# Patient Record
Sex: MALE | Race: WHITE | Employment: UNEMPLOYED | URBAN - METROPOLITAN AREA
[De-identification: names, ages, dates, MRNs, and addresses within clinical notes are randomized per-mention and may not be internally consistent; named-entity substitution may affect disease eponyms.]

---

## 2018-02-25 ENCOUNTER — APPOINTMENT (OUTPATIENT)
Dept: GENERAL RADIOLOGY | Facility: CLINIC | Age: 48
End: 2018-02-25
Payer: MEDICARE

## 2018-02-25 ENCOUNTER — HOSPITAL ENCOUNTER (EMERGENCY)
Facility: CLINIC | Age: 48
Discharge: HOME | End: 2018-02-25
Attending: EMERGENCY MEDICINE
Payer: MEDICARE

## 2018-02-25 VITALS
WEIGHT: 185.19 LBS | HEART RATE: 94 BPM | HEIGHT: 65 IN | OXYGEN SATURATION: 96 % | BODY MASS INDEX: 30.85 KG/M2 | DIASTOLIC BLOOD PRESSURE: 96 MMHG | SYSTOLIC BLOOD PRESSURE: 142 MMHG | TEMPERATURE: 98.3 F | RESPIRATION RATE: 19 BRPM

## 2018-02-25 DIAGNOSIS — R20.2 PARESTHESIA: Primary | ICD-10-CM

## 2018-02-25 DIAGNOSIS — M79.602 PAIN OF LEFT UPPER EXTREMITY: ICD-10-CM

## 2018-02-25 LAB
ABSOLUTE EOS #: 0.2 K/UL (ref 0–0.4)
ABSOLUTE IMMATURE GRANULOCYTE: ABNORMAL K/UL (ref 0–0.3)
ABSOLUTE LYMPH #: 3 K/UL (ref 1–4.8)
ABSOLUTE MONO #: 0.4 K/UL (ref 0.1–1.2)
ANION GAP SERPL CALCULATED.3IONS-SCNC: 11 MMOL/L (ref 9–17)
BASOPHILS # BLD: 1 % (ref 0–2)
BASOPHILS ABSOLUTE: 0 K/UL (ref 0–0.2)
BUN BLDV-MCNC: 8 MG/DL (ref 6–20)
BUN/CREAT BLD: ABNORMAL (ref 9–20)
CALCIUM SERPL-MCNC: 9.5 MG/DL (ref 8.6–10.4)
CHLORIDE BLD-SCNC: 95 MMOL/L (ref 98–107)
CO2: 27 MMOL/L (ref 20–31)
CREAT SERPL-MCNC: 0.6 MG/DL (ref 0.7–1.2)
D-DIMER QUANTITATIVE: 0.34 MG/L FEU
DIFFERENTIAL TYPE: ABNORMAL
EKG ATRIAL RATE: 83 BPM
EKG P AXIS: 27 DEGREES
EKG P-R INTERVAL: 134 MS
EKG Q-T INTERVAL: 338 MS
EKG QRS DURATION: 84 MS
EKG QTC CALCULATION (BAZETT): 397 MS
EKG R AXIS: -3 DEGREES
EKG T AXIS: 85 DEGREES
EKG VENTRICULAR RATE: 83 BPM
EOSINOPHILS RELATIVE PERCENT: 3 % (ref 1–4)
GFR AFRICAN AMERICAN: >60 ML/MIN
GFR NON-AFRICAN AMERICAN: >60 ML/MIN
GFR SERPL CREATININE-BSD FRML MDRD: ABNORMAL ML/MIN/{1.73_M2}
GFR SERPL CREATININE-BSD FRML MDRD: ABNORMAL ML/MIN/{1.73_M2}
GLUCOSE BLD-MCNC: 319 MG/DL (ref 70–99)
HCT VFR BLD CALC: 47.5 % (ref 41–53)
HEMOGLOBIN: 16.6 G/DL (ref 13.5–17.5)
IMMATURE GRANULOCYTES: ABNORMAL %
INR BLD: 1
LYMPHOCYTES # BLD: 45 % (ref 24–44)
MCH RBC QN AUTO: 28.5 PG (ref 26–34)
MCHC RBC AUTO-ENTMCNC: 34.9 G/DL (ref 31–37)
MCV RBC AUTO: 81.6 FL (ref 80–100)
MONOCYTES # BLD: 5 % (ref 2–11)
NRBC AUTOMATED: ABNORMAL PER 100 WBC
PARTIAL THROMBOPLASTIN TIME: 25 SEC (ref 21.3–31.3)
PDW BLD-RTO: 12.8 % (ref 12.5–15.4)
PLATELET # BLD: 200 K/UL (ref 140–450)
PLATELET ESTIMATE: ABNORMAL
PMV BLD AUTO: 9.3 FL (ref 6–12)
POTASSIUM SERPL-SCNC: 4.3 MMOL/L (ref 3.7–5.3)
PROTHROMBIN TIME: 10.5 SEC (ref 9.4–12.6)
RBC # BLD: 5.82 M/UL (ref 4.5–5.9)
RBC # BLD: ABNORMAL 10*6/UL
SEG NEUTROPHILS: 46 % (ref 36–66)
SEGMENTED NEUTROPHILS ABSOLUTE COUNT: 3.1 K/UL (ref 1.8–7.7)
SODIUM BLD-SCNC: 133 MMOL/L (ref 135–144)
TROPONIN INTERP: NORMAL
TROPONIN T: <0.03 NG/ML
WBC # BLD: 6.7 K/UL (ref 3.5–11)
WBC # BLD: ABNORMAL 10*3/UL

## 2018-02-25 PROCEDURE — 36415 COLL VENOUS BLD VENIPUNCTURE: CPT

## 2018-02-25 PROCEDURE — 85379 FIBRIN DEGRADATION QUANT: CPT

## 2018-02-25 PROCEDURE — 71045 X-RAY EXAM CHEST 1 VIEW: CPT

## 2018-02-25 PROCEDURE — 85730 THROMBOPLASTIN TIME PARTIAL: CPT

## 2018-02-25 PROCEDURE — 99284 EMERGENCY DEPT VISIT MOD MDM: CPT

## 2018-02-25 PROCEDURE — 84484 ASSAY OF TROPONIN QUANT: CPT

## 2018-02-25 PROCEDURE — 80048 BASIC METABOLIC PNL TOTAL CA: CPT

## 2018-02-25 PROCEDURE — 93005 ELECTROCARDIOGRAM TRACING: CPT

## 2018-02-25 PROCEDURE — 85610 PROTHROMBIN TIME: CPT

## 2018-02-25 PROCEDURE — 6370000000 HC RX 637 (ALT 250 FOR IP): Performed by: EMERGENCY MEDICINE

## 2018-02-25 PROCEDURE — 85025 COMPLETE CBC W/AUTO DIFF WBC: CPT

## 2018-02-25 RX ORDER — LORATADINE 10 MG/1
10 TABLET ORAL DAILY
COMMUNITY

## 2018-02-25 RX ORDER — LISINOPRIL 10 MG/1
5 TABLET ORAL DAILY
COMMUNITY

## 2018-02-25 RX ORDER — ASPIRIN 81 MG/1
324 TABLET, CHEWABLE ORAL ONCE
Status: COMPLETED | OUTPATIENT
Start: 2018-02-25 | End: 2018-02-25

## 2018-02-25 RX ORDER — FLUTICASONE PROPIONATE 50 MCG
1 SPRAY, SUSPENSION (ML) NASAL DAILY
COMMUNITY

## 2018-02-25 RX ORDER — GLYBURIDE 5 MG/1
5 TABLET ORAL
COMMUNITY

## 2018-02-25 RX ORDER — KETOTIFEN FUMARATE 0.35 MG/ML
1 SOLUTION/ DROPS OPHTHALMIC 2 TIMES DAILY
COMMUNITY

## 2018-02-25 RX ORDER — ATORVASTATIN CALCIUM 20 MG/1
40 TABLET, FILM COATED ORAL DAILY
COMMUNITY

## 2018-02-25 RX ADMIN — ASPIRIN 81 MG 324 MG: 81 TABLET ORAL at 12:53

## 2018-02-25 ASSESSMENT — PAIN SCALES - GENERAL: PAINLEVEL_OUTOF10: 6

## 2018-02-25 ASSESSMENT — PAIN DESCRIPTION - PAIN TYPE: TYPE: ACUTE PAIN

## 2018-02-25 ASSESSMENT — PAIN DESCRIPTION - ORIENTATION: ORIENTATION: LEFT

## 2018-02-25 ASSESSMENT — PAIN DESCRIPTION - LOCATION: LOCATION: ARM;HAND

## 2018-02-25 NOTE — ED PROVIDER NOTES
[facility] ED  [address] [address]  Phone: [phone]  eMERGENCY dEPARTMENT eNCOUnter      Pt Name: Walt Nolan  MRN: [de-identified]  [place] 1970  Date of evaluation: 2/25/2018      CHIEF COMPLAINT       Chief Complaint   Patient presents with    Numbness     left hand numbness, left upper arm pain. feels better to hold arm down to his side. no injury. previous stroke 5 years ago. HISTORY OF PRESENT ILLNESS    Sanju Pruett is a 53 y.o. male who presents To the emergency department complaining of left upper arm pain and arm numbness intermittent for the past 2 days. Worse with movement. He denies any specific injury states that he did lift up his kid but denied hurting himself at that time. He had an MI 5 years ago and states that this is similar to his pain was much more intense at that time. He has right arm pain but he says that is due to the work that he does that is not new and it is chronic in nature. He denies any weakness. No headache blurry vision double vision or neck pain. No chest pain or shortness of breath. He denies any lightheadedness or dizziness. Denies any other complaints. He does not currently have a cardiologist did not require any heart catheter stents    [facility]       Constitutional: No fevers or chills   HEENT: No sore throat, rhinorrhea, or earache   Eyes: No blurry vision or double vision no drainage   Cardiovascular: No chest pain or tachycardia   Respiratory: No wheezing or shortness of breath no cough   Gastrointestinal: No nausea, vomiting, diarrhea, constipation, or abdominal pain   : No hematuria or dysuria   Musculoskeletal: Positive left arm pain  Skin: No rash   Neurological: Positive left arm numbness    PAST MEDICAL HISTORY    has a past medical history of Diabetes mellitus ([facility] [address] and MI (myocardial infarction).     SURGICAL HISTORY      has no past surgical history on to palpation no deformity. Radial and ulnar arteries intact. Capillary refill less than 2 seconds. No sensory deficit during exam  Neurologic: Moving all 4 extremities without difficulty there are no gross focal neurologic deficits   Skin: Warm and dry     Physical Exam  DIFFERENTIAL DIAGNOSIS/ MDM:     History of cardiac disease. EKG chest x-ray labs and aspirin. Intermittent left arm pain and numbness better at rest worse with movement. DIAGNOSTIC RESULTS     EKG: All EKG's are interpreted by the Emergency Department Physician who either signs or Co-signs this chart in the absence of a cardiologist.    12:51 PM sinus rhythm rate 83  QRS 84  no acute ST or T wave changes.     Not indicated unless otherwise documented above    LABS:  Results for orders placed or performed during the hospital encounter of 02/25/18   CBC Auto Differential   Result Value Ref Range    WBC 6.7 3.5 - 11.0 k/uL    RBC 5.82 4.5 - 5.9 m/uL    Hemoglobin 16.6 13.5 - 17.5 g/dL    Hematocrit 47.5 41 - 53 %    MCV 81.6 80 - 100 fL    MCH 28.5 26 - 34 pg    MCHC 34.9 31 - 37 g/dL    RDW 12.8 12.5 - 15.4 %    Platelets 932 321 - 243 k/uL    MPV 9.3 6.0 - 12.0 fL    NRBC Automated NOT REPORTED per 100 WBC    Differential Type NOT REPORTED     Seg Neutrophils 46 36 - 66 %    Lymphocytes 45 (H) 24 - 44 %    Monocytes 5 2 - 11 %    Eosinophils % 3 1 - 4 %    Basophils 1 0 - 2 %    Immature Granulocytes NOT REPORTED 0 %    Segs Absolute 3.10 1.8 - 7.7 k/uL    Absolute Lymph # 3.00 1.0 - 4.8 k/uL    Absolute Mono # 0.40 0.1 - 1.2 k/uL    Absolute Eos # 0.20 0.0 - 0.4 k/uL    Basophils # 0.00 0.0 - 0.2 k/uL    Absolute Immature Granulocyte NOT REPORTED 0.00 - 0.30 k/uL    WBC Morphology NOT REPORTED     RBC Morphology NOT REPORTED     Platelet Estimate NOT REPORTED    Basic Metabolic Panel   Result Value Ref Range    Glucose 319 (H) 70 - 99 mg/dL    BUN 8 6 - 20 mg/dL    CREATININE 0.60 (L) 0.70 - 1.20 mg/dL    Bun/Cre Ratio NOT REPORTED 9 - 20    Calcium 9.5 8.6 - 10.4 mg/dL    Sodium 133 (L) 135 - 144 mmol/L    Potassium 4.3 3.7 - 5.3 mmol/L    Chloride 95 (L) 98 - 107 mmol/L    CO2 27 20 - 31 mmol/L    Anion Gap 11 9 - 17 mmol/L    GFR Non-African American >60 >60 mL/min    GFR African American >60 >60 mL/min    GFR Comment          GFR Staging NOT REPORTED    Protime-INR   Result Value Ref Range    Protime 10.5 9.4 - 12.6 sec    INR 1.0    APTT   Result Value Ref Range    PTT 25.0 21.3 - 31.3 sec   Troponin   Result Value Ref Range    Troponin T <0.03 <0.03 ng/mL    Troponin Interp         D-Dimer, Quantitative   Result Value Ref Range    D-Dimer, Quant 0.34 mg/L FEU       Not indicated unless otherwise documented above    RADIOLOGY:   I reviewed the radiologist interpretations:    XR CHEST PORTABLE   Final Result   No acute abnormality. Not indicated unless otherwise documented above    EMERGENCY DEPARTMENT COURSE:     The patient was given the following medications:  Orders Placed This Encounter   Medications    aspirin chewable tablet 324 mg        Vitals:    Vitals:    02/25/18 1225 02/25/18 1233 02/25/18 1251   BP:  124/85    Pulse:  94    Resp:  18    Temp:   98.3 °F (36.8 °C)   TempSrc: Oral Oral Oral   SpO2:  97%    Weight:  84 kg (185 lb 3 oz)    Height:  5' 5\" (1.651 m)      -------------------------  /85   Pulse 94   Temp 98.3 °F (36.8 °C) (Oral)   Resp 18   Ht 5' 5\" (1.651 m)   Wt 84 kg (185 lb 3 oz)   SpO2 97%   BMI 30.82 kg/m²     1:45 PM no acute distress. Arm pain with numbness intermittently for the past 2-3 days. Denies specific injury. Troponin negative EKG and negative chest x-ray unremarkable and labs unremarkable. Patient needs to follow-up with his family doctor and/or establish himself with a cardiologist.  His pain is reproducible with movement. Results discussed with him using a  patient voices understanding. Discharge to follow-up.     I have reviewed the disposition

## 2019-01-22 ENCOUNTER — HOSPITAL ENCOUNTER (OUTPATIENT)
Age: 49
Setting detail: SPECIMEN
Discharge: HOME | End: 2019-01-22
Payer: MEDICARE

## 2019-01-22 LAB
ANION GAP SERPL CALCULATED.3IONS-SCNC: 14 MMOL/L (ref 9–17)
BUN BLDV-MCNC: 8 MG/DL (ref 6–20)
BUN/CREAT BLD: ABNORMAL (ref 9–20)
CALCIUM SERPL-MCNC: 9.6 MG/DL (ref 8.6–10.4)
CHLORIDE BLD-SCNC: 101 MMOL/L (ref 98–107)
CO2: 23 MMOL/L (ref 20–31)
CREAT SERPL-MCNC: 0.66 MG/DL (ref 0.7–1.2)
GFR AFRICAN AMERICAN: >60 ML/MIN
GFR NON-AFRICAN AMERICAN: >60 ML/MIN
GFR SERPL CREATININE-BSD FRML MDRD: ABNORMAL ML/MIN/{1.73_M2}
GFR SERPL CREATININE-BSD FRML MDRD: ABNORMAL ML/MIN/{1.73_M2}
GLUCOSE BLD-MCNC: 158 MG/DL (ref 70–99)
POTASSIUM SERPL-SCNC: 4.5 MMOL/L (ref 3.7–5.3)
SODIUM BLD-SCNC: 138 MMOL/L (ref 135–144)

## 2019-09-15 ENCOUNTER — HOSPITAL ENCOUNTER (EMERGENCY)
Facility: CLINIC | Age: 49
Discharge: ANOTHER HOSPITAL | End: 2019-09-15
Attending: EMERGENCY MEDICINE
Payer: MEDICARE

## 2019-09-15 ENCOUNTER — APPOINTMENT (OUTPATIENT)
Dept: CT IMAGING | Facility: CLINIC | Age: 49
End: 2019-09-15
Payer: MEDICARE

## 2019-09-15 ENCOUNTER — APPOINTMENT (OUTPATIENT)
Dept: GENERAL RADIOLOGY | Facility: CLINIC | Age: 49
End: 2019-09-15
Payer: MEDICARE

## 2019-09-15 VITALS
SYSTOLIC BLOOD PRESSURE: 85 MMHG | DIASTOLIC BLOOD PRESSURE: 60 MMHG | TEMPERATURE: 98.6 F | WEIGHT: 215 LBS | OXYGEN SATURATION: 93 % | HEART RATE: 130 BPM | HEIGHT: 67 IN | RESPIRATION RATE: 32 BRPM | BODY MASS INDEX: 33.74 KG/M2

## 2019-09-15 DIAGNOSIS — R65.10 SIRS (SYSTEMIC INFLAMMATORY RESPONSE SYNDROME) (HCC): Primary | ICD-10-CM

## 2019-09-15 LAB
-: ABNORMAL
ABSOLUTE EOS #: 0 K/UL (ref 0–0.4)
ABSOLUTE IMMATURE GRANULOCYTE: ABNORMAL K/UL (ref 0–0.3)
ABSOLUTE LYMPH #: 0.7 K/UL (ref 1–4.8)
ABSOLUTE MONO #: 0.3 K/UL (ref 0.1–1.2)
AMORPHOUS: ABNORMAL
ANION GAP SERPL CALCULATED.3IONS-SCNC: 13 MMOL/L (ref 9–17)
BACTERIA: ABNORMAL
BASOPHILS # BLD: 0 % (ref 0–2)
BASOPHILS ABSOLUTE: 0 K/UL (ref 0–0.2)
BILIRUBIN URINE: NEGATIVE
BNP INTERPRETATION: NORMAL
BUN BLDV-MCNC: 8 MG/DL (ref 6–20)
BUN/CREAT BLD: ABNORMAL (ref 9–20)
CALCIUM SERPL-MCNC: 9.5 MG/DL (ref 8.6–10.4)
CASTS UA: ABNORMAL /LPF (ref 0–2)
CHLORIDE BLD-SCNC: 99 MMOL/L (ref 98–107)
CK MB: 2.4 NG/ML
CO2: 26 MMOL/L (ref 20–31)
COLOR: YELLOW
COMMENT UA: ABNORMAL
CREAT SERPL-MCNC: 0.7 MG/DL (ref 0.7–1.2)
CRYSTALS, UA: ABNORMAL /HPF
DIFFERENTIAL TYPE: ABNORMAL
DIRECT EXAM: NORMAL
EOSINOPHILS RELATIVE PERCENT: 0 % (ref 1–4)
EPITHELIAL CELLS UA: ABNORMAL /HPF (ref 0–5)
GFR AFRICAN AMERICAN: >60 ML/MIN
GFR NON-AFRICAN AMERICAN: >60 ML/MIN
GFR SERPL CREATININE-BSD FRML MDRD: ABNORMAL ML/MIN/{1.73_M2}
GFR SERPL CREATININE-BSD FRML MDRD: ABNORMAL ML/MIN/{1.73_M2}
GLUCOSE BLD-MCNC: 184 MG/DL (ref 70–99)
GLUCOSE URINE: NEGATIVE
HCT VFR BLD CALC: 47.3 % (ref 41–53)
HEMOGLOBIN: 16.1 G/DL (ref 13.5–17.5)
IMMATURE GRANULOCYTES: ABNORMAL %
KETONES, URINE: NEGATIVE
LACTIC ACID: 2.5 MMOL/L (ref 0.5–2.2)
LACTIC ACID: 3 MMOL/L (ref 0.5–2.2)
LEUKOCYTE ESTERASE, URINE: NEGATIVE
LIPASE: 31 U/L (ref 13–60)
LYMPHOCYTES # BLD: 8 % (ref 24–44)
Lab: NORMAL
MCH RBC QN AUTO: 28.2 PG (ref 26–34)
MCHC RBC AUTO-ENTMCNC: 33.9 G/DL (ref 31–37)
MCV RBC AUTO: 83 FL (ref 80–100)
MONOCYTES # BLD: 3 % (ref 2–11)
MUCUS: ABNORMAL
NITRITE, URINE: NEGATIVE
NRBC AUTOMATED: ABNORMAL PER 100 WBC
OTHER OBSERVATIONS UA: ABNORMAL
PDW BLD-RTO: 13.5 % (ref 12.5–15.4)
PH UA: 8.5 (ref 5–8)
PLATELET # BLD: 180 K/UL (ref 140–450)
PLATELET ESTIMATE: ABNORMAL
PMV BLD AUTO: 9.5 FL (ref 6–12)
POTASSIUM SERPL-SCNC: 4.3 MMOL/L (ref 3.7–5.3)
PRO-BNP: 53 PG/ML
PROTEIN UA: NEGATIVE
RBC # BLD: 5.69 M/UL (ref 4.5–5.9)
RBC # BLD: ABNORMAL 10*6/UL
RBC UA: ABNORMAL /HPF (ref 0–2)
RENAL EPITHELIAL, UA: ABNORMAL /HPF
SEG NEUTROPHILS: 89 % (ref 36–66)
SEGMENTED NEUTROPHILS ABSOLUTE COUNT: 7.8 K/UL (ref 1.8–7.7)
SODIUM BLD-SCNC: 138 MMOL/L (ref 135–144)
SPECIFIC GRAVITY UA: 1.01 (ref 1–1.03)
SPECIMEN DESCRIPTION: NORMAL
TRICHOMONAS: ABNORMAL
TROPONIN INTERP: NORMAL
TROPONIN INTERP: NORMAL
TROPONIN T: NORMAL NG/ML
TROPONIN T: NORMAL NG/ML
TROPONIN, HIGH SENSITIVITY: 7 NG/L (ref 0–22)
TROPONIN, HIGH SENSITIVITY: 8 NG/L (ref 0–22)
TURBIDITY: CLEAR
URINE HGB: NEGATIVE
UROBILINOGEN, URINE: ABNORMAL
WBC # BLD: 8.8 K/UL (ref 3.5–11)
WBC # BLD: ABNORMAL 10*3/UL
WBC UA: ABNORMAL /HPF (ref 0–5)
YEAST: ABNORMAL

## 2019-09-15 PROCEDURE — 87205 SMEAR GRAM STAIN: CPT

## 2019-09-15 PROCEDURE — 96365 THER/PROPH/DIAG IV INF INIT: CPT

## 2019-09-15 PROCEDURE — 93005 ELECTROCARDIOGRAM TRACING: CPT | Performed by: EMERGENCY MEDICINE

## 2019-09-15 PROCEDURE — 83605 ASSAY OF LACTIC ACID: CPT

## 2019-09-15 PROCEDURE — 83690 ASSAY OF LIPASE: CPT

## 2019-09-15 PROCEDURE — 85025 COMPLETE CBC W/AUTO DIFF WBC: CPT

## 2019-09-15 PROCEDURE — 84484 ASSAY OF TROPONIN QUANT: CPT

## 2019-09-15 PROCEDURE — 36415 COLL VENOUS BLD VENIPUNCTURE: CPT

## 2019-09-15 PROCEDURE — 82553 CREATINE MB FRACTION: CPT

## 2019-09-15 PROCEDURE — 96375 TX/PRO/DX INJ NEW DRUG ADDON: CPT

## 2019-09-15 PROCEDURE — 71260 CT THORAX DX C+: CPT

## 2019-09-15 PROCEDURE — 80048 BASIC METABOLIC PNL TOTAL CA: CPT

## 2019-09-15 PROCEDURE — 6360000004 HC RX CONTRAST MEDICATION: Performed by: EMERGENCY MEDICINE

## 2019-09-15 PROCEDURE — 2580000003 HC RX 258: Performed by: EMERGENCY MEDICINE

## 2019-09-15 PROCEDURE — 6360000002 HC RX W HCPCS: Performed by: EMERGENCY MEDICINE

## 2019-09-15 PROCEDURE — 83880 ASSAY OF NATRIURETIC PEPTIDE: CPT

## 2019-09-15 PROCEDURE — 6370000000 HC RX 637 (ALT 250 FOR IP): Performed by: EMERGENCY MEDICINE

## 2019-09-15 PROCEDURE — 71046 X-RAY EXAM CHEST 2 VIEWS: CPT

## 2019-09-15 PROCEDURE — 87186 SC STD MICRODIL/AGAR DIL: CPT

## 2019-09-15 PROCEDURE — 87077 CULTURE AEROBIC IDENTIFY: CPT

## 2019-09-15 PROCEDURE — 87804 INFLUENZA ASSAY W/OPTIC: CPT

## 2019-09-15 PROCEDURE — 87040 BLOOD CULTURE FOR BACTERIA: CPT

## 2019-09-15 PROCEDURE — 99285 EMERGENCY DEPT VISIT HI MDM: CPT

## 2019-09-15 PROCEDURE — 96361 HYDRATE IV INFUSION ADD-ON: CPT

## 2019-09-15 PROCEDURE — 81001 URINALYSIS AUTO W/SCOPE: CPT

## 2019-09-15 RX ORDER — 0.9 % SODIUM CHLORIDE 0.9 %
1000 INTRAVENOUS SOLUTION INTRAVENOUS ONCE
Status: COMPLETED | OUTPATIENT
Start: 2019-09-15 | End: 2019-09-15

## 2019-09-15 RX ORDER — SODIUM CHLORIDE 0.9 % (FLUSH) 0.9 %
10 SYRINGE (ML) INJECTION PRN
Status: DISCONTINUED | OUTPATIENT
Start: 2019-09-15 | End: 2019-09-15 | Stop reason: HOSPADM

## 2019-09-15 RX ORDER — FUROSEMIDE 20 MG/1
20 TABLET ORAL DAILY
COMMUNITY

## 2019-09-15 RX ORDER — GABAPENTIN 300 MG/1
300 CAPSULE ORAL 3 TIMES DAILY
COMMUNITY

## 2019-09-15 RX ORDER — ONDANSETRON 2 MG/ML
4 INJECTION INTRAMUSCULAR; INTRAVENOUS ONCE
Status: COMPLETED | OUTPATIENT
Start: 2019-09-15 | End: 2019-09-15

## 2019-09-15 RX ORDER — 0.9 % SODIUM CHLORIDE 0.9 %
70 INTRAVENOUS SOLUTION INTRAVENOUS ONCE
Status: COMPLETED | OUTPATIENT
Start: 2019-09-15 | End: 2019-09-15

## 2019-09-15 RX ORDER — ACETAMINOPHEN 500 MG
1000 TABLET ORAL ONCE
Status: COMPLETED | OUTPATIENT
Start: 2019-09-15 | End: 2019-09-15

## 2019-09-15 RX ORDER — ONDANSETRON 2 MG/ML
4 INJECTION INTRAMUSCULAR; INTRAVENOUS ONCE
Status: DISCONTINUED | OUTPATIENT
Start: 2019-09-15 | End: 2019-09-15 | Stop reason: HOSPADM

## 2019-09-15 RX ADMIN — VANCOMYCIN HYDROCHLORIDE 2500 MG: 1 INJECTION, POWDER, LYOPHILIZED, FOR SOLUTION INTRAVENOUS at 14:31

## 2019-09-15 RX ADMIN — SODIUM CHLORIDE 1000 ML: 9 INJECTION, SOLUTION INTRAVENOUS at 14:12

## 2019-09-15 RX ADMIN — ACETAMINOPHEN 1000 MG: 500 TABLET ORAL at 10:46

## 2019-09-15 RX ADMIN — ONDANSETRON 4 MG: 2 INJECTION INTRAMUSCULAR; INTRAVENOUS at 10:42

## 2019-09-15 RX ADMIN — PIPERACILLIN SODIUM,TAZOBACTAM SODIUM 3.38 G: 3; .375 INJECTION, POWDER, FOR SOLUTION INTRAVENOUS at 13:33

## 2019-09-15 RX ADMIN — SODIUM CHLORIDE 70 ML: 9 INJECTION, SOLUTION INTRAVENOUS at 11:37

## 2019-09-15 RX ADMIN — SODIUM CHLORIDE 1000 ML: 9 INJECTION, SOLUTION INTRAVENOUS at 10:42

## 2019-09-15 RX ADMIN — IOPAMIDOL 80 ML: 755 INJECTION, SOLUTION INTRAVENOUS at 11:37

## 2019-09-15 RX ADMIN — Medication 10 ML: at 11:33

## 2019-09-15 RX ADMIN — SODIUM CHLORIDE 1000 ML: 9 INJECTION, SOLUTION INTRAVENOUS at 12:44

## 2019-09-15 ASSESSMENT — PAIN DESCRIPTION - LOCATION: LOCATION: CHEST

## 2019-09-15 ASSESSMENT — PAIN DESCRIPTION - PAIN TYPE: TYPE: ACUTE PAIN

## 2019-09-15 ASSESSMENT — PAIN - FUNCTIONAL ASSESSMENT: PAIN_FUNCTIONAL_ASSESSMENT: PREVENTS OR INTERFERES SOME ACTIVE ACTIVITIES AND ADLS

## 2019-09-15 ASSESSMENT — PAIN DESCRIPTION - DESCRIPTORS: DESCRIPTORS: ACHING

## 2019-09-15 ASSESSMENT — PAIN SCALES - GENERAL
PAINLEVEL_OUTOF10: 2
PAINLEVEL_OUTOF10: 8
PAINLEVEL_OUTOF10: 8

## 2019-09-15 ASSESSMENT — PAIN DESCRIPTION - FREQUENCY: FREQUENCY: CONTINUOUS

## 2019-09-15 ASSESSMENT — PAIN DESCRIPTION - PROGRESSION: CLINICAL_PROGRESSION: GRADUALLY WORSENING

## 2019-09-15 ASSESSMENT — PAIN DESCRIPTION - ONSET: ONSET: PROGRESSIVE

## 2019-09-15 NOTE — ED NOTES
Pt placed on continuous cardiac and pulse oximetry monitoring.         Jacqueline Russell RN  09/15/19 9736

## 2019-09-15 NOTE — ED PROVIDER NOTES
[facility]  [address] [facility]  Phone: [phone]        Pt Name: Rizwan Wise  MRN: [de-identified]  [place] 1970  Date of evaluation: 9/15/19      CHIEF COMPLAINT       Chief Complaint   Patient presents with    Cough    Chest Pain    Emesis    Chills         HISTORY OF PRESENT ILLNESS    Lucas Escamilla is a 51 y.o. male who presents with chief complaint of a cough this been nonproductive fever nausea vomiting and some mid chest pain began yesterday no diarrhea no abdominal pain no sore throat. Patient does have a history of diabetes heart disease did have a pacemaker placed last year with an AICD      REVIEW OF SYSTEMS       General fever and chills  HEENT no sore throat or congestion  Respiratory cough some chest pain nonproductive  Cardiovascular some chest pain rapid heart rate no edema  GI nausea and vomiting, no diarrhea, no melena, musculoskeletal no swelling or joint pain  No neurologic no headaches or syncope  Skin no rash or pallor this is fine having all pops up    [address]ia [employer] [address]    has a past medical history of Diabetes mellitus ([address] and MI (myocardial infarction) ([facility] [address] SURGICAL HISTORY      has no past surgical history on file. CURRENT MEDICATIONS       Discharge Medication List as of 9/15/2019  3:07 PM      CONTINUE these medications which have NOT CHANGED    Details   metoprolol tartrate (LOPRESSOR) 25 MG tablet Take 25 mg by mouth 2 times dailyHistorical Med      gabapentin (NEURONTIN) 300 MG capsule Take 300 mg by mouth 3 times daily. Historical Med      SITagliptin (JANUVIA) 100 MG tablet Take 100 mg by mouth dailyHistorical Med      sertraline (ZOLOFT) 50 MG tablet Take 50 mg by mouth dailyHistorical Med      furosemide (LASIX) 20 MG tablet Take 20 mg by mouth dailyHistorical Med      insulin glargine (BASAGLAR KWIKPEN) 100 UNIT/ML injection pen Inject into the skin nightlyHistorical Med      aspirin 81 MG tablet Take 81 mg by mouth dailyHistorical Med SUSCEPTIBILITY PANEL MILADY     amikacin Value in next row        NOT REPORTED     ampicillin Value in next row Resistant       >=32RESISTANT     ampicillin-sulbactam Value in next row        NOT REPORTED     aztreonam Value in next row Sensitive       <=1SUSCEPTIBLE     ceFAZolin Value in next row Sensitive       8SUSCEPTIBLE     cefepime Value in next row        NOT REPORTED     cefTRIAXone Value in next row Sensitive       <=1SUSCEPTIBLE     ciprofloxacin Value in next row Sensitive       <=0.25SUSCEPTIBLE     ertapenem Value in next row        NOT REPORTED     Confirmatory Extended Spectrum Beta-Lactamase Value in next row Negative       NOT REPORTED     gentamicin Value in next row Sensitive       <=1SUSCEPTIBLE     meropenem Value in next row        NOT REPORTED     nitrofurantoin Value in next row        NOT REPORTED     tigecycline Value in next row        NOT REPORTED     tobramycin Value in next row Sensitive       <=1SUSCEPTIBLE     trimethoprim-sulfamethoxazole Value in next row Resistant       >=320RESISTANT     piperacillin-tazobactam Value in next row Sensitive       <=4SUSCEPTIBLE   Culture Blood #1   Result Value Ref Range    Specimen Description . BLOOD     Special Requests right antecube 20cc     Culture NO GROWTH 4 DAYS    Rapid influenza A/B antigens   Result Value Ref Range    Specimen Description . NASOPHARYNGEAL SWAB     Special Requests NOT REPORTED     Direct Exam       PRESUMPTIVE NEGATIVE for Influenza A + B antigens. PCR testing to confirm this result is available upon request.  Specimen will be saved in the laboratory for 7 days. Please call [phone] if PCR testing is indicated.    Basic Metabolic Panel   Result Value Ref Range    Glucose 184 (H) 70 - 99 mg/dL    BUN 8 6 - 20 mg/dL    CREATININE 0.70 0.70 - 1.20 mg/dL    Bun/Cre Ratio NOT REPORTED 9 - 20    Calcium 9.5 8.6 - 10.4 mg/dL    Sodium 138 135 - 144 mmol/L    Potassium 4.3 3.7 - 5.3 mmol/L Chloride 99 98 - 107 mmol/L    CO2 26 20 - 31 mmol/L    Anion Gap 13 9 - 17 mmol/L    GFR Non-African American >60 >60 mL/min    GFR African American >60 >60 mL/min    GFR Comment          GFR Staging NOT REPORTED    CK MB   Result Value Ref Range    CK-MB 2.4 <10.5 ng/mL   Lipase   Result Value Ref Range    Lipase 31 13 - 60 U/L   Urinalysis   Result Value Ref Range    Color, UA YELLOW YELLOW    Turbidity UA CLEAR CLEAR    Glucose, Ur NEGATIVE NEGATIVE    Bilirubin Urine NEGATIVE NEGATIVE    Ketones, Urine NEGATIVE NEGATIVE    Specific Gravity, UA 1.015 1.005 - 1.030    Urine Hgb NEGATIVE NEGATIVE    pH, UA 8.5 (H) 5.0 - 8.0    Protein, UA NEGATIVE NEGATIVE    Urobilinogen, Urine ELEVATED (A) Normal    Nitrite, Urine NEGATIVE NEGATIVE    Leukocyte Esterase, Urine NEGATIVE NEGATIVE    Urinalysis Comments NOT REPORTED    Lactic Acid   Result Value Ref Range    Lactic Acid 2.5 (H) 0.5 - 2.2 mmol/L   Troponin   Result Value Ref Range    Troponin, High Sensitivity 7 0 - 22 ng/L    Troponin T NOT REPORTED <0.03 ng/mL    Troponin Interp NOT REPORTED    CBC Auto Differential   Result Value Ref Range    WBC 8.8 3.5 - 11.0 k/uL    RBC 5.69 4.5 - 5.9 m/uL    Hemoglobin 16.1 13.5 - 17.5 g/dL    Hematocrit 47.3 41 - 53 %    MCV 83.0 80 - 100 fL    MCH 28.2 26 - 34 pg    MCHC 33.9 31 - 37 g/dL    RDW 13.5 12.5 - 15.4 %    Platelets 263 846 - 712 k/uL    MPV 9.5 6.0 - 12.0 fL    NRBC Automated NOT REPORTED per 100 WBC    Differential Type NOT REPORTED     Seg Neutrophils 89 (H) 36 - 66 %    Lymphocytes 8 (L) 24 - 44 %    Monocytes 3 2 - 11 %    Eosinophils % 0 (L) 1 - 4 %    Basophils 0 0 - 2 %    Immature Granulocytes NOT REPORTED 0 %    Segs Absolute 7.80 (H) 1.8 - 7.7 k/uL    Absolute Lymph # 0.70 (L) 1.0 - 4.8 k/uL    Absolute Mono # 0.30 0.1 - 1.2 k/uL    Absolute Eos # 0.00 0.0 - 0.4 k/uL    Basophils Absolute 0.00 0.0 - 0.2 k/uL    Absolute Immature Granulocyte NOT REPORTED 0.00 - 0.30 k/uL    WBC Morphology NOT REPORTED

## 2019-09-16 LAB
EKG ATRIAL RATE: 120 BPM
EKG ATRIAL RATE: 133 BPM
EKG P AXIS: 52 DEGREES
EKG P-R INTERVAL: 136 MS
EKG P-R INTERVAL: 146 MS
EKG Q-T INTERVAL: 286 MS
EKG Q-T INTERVAL: 314 MS
EKG QRS DURATION: 72 MS
EKG QRS DURATION: 72 MS
EKG QTC CALCULATION (BAZETT): 425 MS
EKG QTC CALCULATION (BAZETT): 443 MS
EKG R AXIS: -11 DEGREES
EKG R AXIS: 48 DEGREES
EKG T AXIS: -32 DEGREES
EKG T AXIS: 110 DEGREES
EKG VENTRICULAR RATE: 120 BPM
EKG VENTRICULAR RATE: 133 BPM

## 2019-09-17 LAB
CULTURE: ABNORMAL
Lab: ABNORMAL
SPECIMEN DESCRIPTION: ABNORMAL

## 2019-09-21 LAB
CULTURE: NORMAL
Lab: NORMAL
SPECIMEN DESCRIPTION: NORMAL

## 2022-03-22 ENCOUNTER — HOSPITAL ENCOUNTER (EMERGENCY)
Facility: CLINIC | Age: 52
Discharge: HOME | End: 2022-03-22
Attending: EMERGENCY MEDICINE
Payer: MEDICARE

## 2022-03-22 ENCOUNTER — APPOINTMENT (OUTPATIENT)
Dept: CT IMAGING | Facility: CLINIC | Age: 52
End: 2022-03-22
Payer: MEDICARE

## 2022-03-22 VITALS
HEIGHT: 67 IN | HEART RATE: 92 BPM | SYSTOLIC BLOOD PRESSURE: 121 MMHG | DIASTOLIC BLOOD PRESSURE: 82 MMHG | TEMPERATURE: 98.1 F | WEIGHT: 200 LBS | OXYGEN SATURATION: 98 % | RESPIRATION RATE: 16 BRPM | BODY MASS INDEX: 31.39 KG/M2

## 2022-03-22 DIAGNOSIS — R19.7 DIARRHEA, UNSPECIFIED TYPE: ICD-10-CM

## 2022-03-22 DIAGNOSIS — R07.9 CHEST PAIN, UNSPECIFIED TYPE: ICD-10-CM

## 2022-03-22 DIAGNOSIS — N28.1 RENAL CYST, LEFT: ICD-10-CM

## 2022-03-22 DIAGNOSIS — R10.32 LEFT LOWER QUADRANT ABDOMINAL PAIN: Primary | ICD-10-CM

## 2022-03-22 LAB
ABSOLUTE EOS #: 0.11 K/UL (ref 0–0.4)
ABSOLUTE LYMPH #: 1.26 K/UL (ref 1–4.8)
ABSOLUTE MONO #: 0.42 K/UL (ref 0.1–0.8)
ANION GAP SERPL CALCULATED.3IONS-SCNC: 13 MMOL/L (ref 9–17)
ATYPICAL LYMPHOCYTE ABSOLUTE COUNT: 0.42 K/UL
ATYPICAL LYMPHOCYTES: 4 %
BASOPHILS # BLD: 1 % (ref 0–2)
BASOPHILS ABSOLUTE: 0.11 K/UL (ref 0–0.2)
BILIRUBIN URINE: NEGATIVE
BUN BLDV-MCNC: 11 MG/DL (ref 6–20)
CALCIUM SERPL-MCNC: 8.1 MG/DL (ref 8.6–10.4)
CHLORIDE BLD-SCNC: 101 MMOL/L (ref 98–107)
CO2: 24 MMOL/L (ref 20–31)
COLOR: YELLOW
COMMENT UA: NORMAL
CREAT SERPL-MCNC: 0.7 MG/DL (ref 0.7–1.2)
D-DIMER QUANTITATIVE: 3.28 MG/L FEU
EKG ATRIAL RATE: 84 BPM
EKG ATRIAL RATE: 90 BPM
EKG P AXIS: 44 DEGREES
EKG P AXIS: 51 DEGREES
EKG P-R INTERVAL: 120 MS
EKG P-R INTERVAL: 148 MS
EKG Q-T INTERVAL: 354 MS
EKG Q-T INTERVAL: 356 MS
EKG QRS DURATION: 78 MS
EKG QRS DURATION: 86 MS
EKG QTC CALCULATION (BAZETT): 420 MS
EKG QTC CALCULATION (BAZETT): 433 MS
EKG R AXIS: -4 DEGREES
EKG R AXIS: -6 DEGREES
EKG T AXIS: 81 DEGREES
EKG T AXIS: 84 DEGREES
EKG VENTRICULAR RATE: 84 BPM
EKG VENTRICULAR RATE: 90 BPM
EOSINOPHILS RELATIVE PERCENT: 1 % (ref 1–4)
GFR AFRICAN AMERICAN: >60 ML/MIN
GFR NON-AFRICAN AMERICAN: >60 ML/MIN
GFR SERPL CREATININE-BSD FRML MDRD: ABNORMAL ML/MIN/{1.73_M2}
GLUCOSE BLD-MCNC: 150 MG/DL (ref 70–99)
GLUCOSE URINE: NEGATIVE
HCT VFR BLD CALC: 40.2 % (ref 41–53)
HEMOGLOBIN: 12.5 G/DL (ref 13.5–17.5)
INR BLD: 1.1
KETONES, URINE: NEGATIVE
LEUKOCYTE ESTERASE, URINE: NEGATIVE
LIPASE: 33 U/L (ref 13–60)
LYMPHOCYTES # BLD: 12 % (ref 24–44)
MCH RBC QN AUTO: 20.7 PG (ref 26–34)
MCHC RBC AUTO-ENTMCNC: 31.1 G/DL (ref 31–37)
MCV RBC AUTO: 66.5 FL (ref 80–100)
MONOCYTES # BLD: 4 % (ref 1–7)
MORPHOLOGY: ABNORMAL
NITRITE, URINE: NEGATIVE
PARTIAL THROMBOPLASTIN TIME: 25.2 SEC (ref 21.3–31.3)
PDW BLD-RTO: 16.8 % (ref 12.5–15.4)
PH UA: 5.5 (ref 5–8)
PLATELET # BLD: 153 K/UL (ref 140–450)
PMV BLD AUTO: 8.3 FL (ref 6–12)
POTASSIUM SERPL-SCNC: 4.3 MMOL/L (ref 3.7–5.3)
PRO-BNP: 368 PG/ML
PROTEIN UA: NEGATIVE
PROTHROMBIN TIME: 11.8 SEC (ref 9.4–12.6)
RBC # BLD: 6.05 M/UL (ref 4.5–5.9)
SEG NEUTROPHILS: 78 % (ref 36–66)
SEGMENTED NEUTROPHILS ABSOLUTE COUNT: 8.18 K/UL (ref 1.8–7.7)
SODIUM BLD-SCNC: 138 MMOL/L (ref 135–144)
SPECIFIC GRAVITY UA: 1.02 (ref 1–1.03)
TROPONIN, HIGH SENSITIVITY: 25 NG/L (ref 0–22)
TROPONIN, HIGH SENSITIVITY: 26 NG/L (ref 0–22)
TURBIDITY: CLEAR
URINE HGB: NEGATIVE
UROBILINOGEN, URINE: NORMAL
WBC # BLD: 10.5 K/UL (ref 3.5–11)

## 2022-03-22 PROCEDURE — 6360000002 HC RX W HCPCS: Performed by: EMERGENCY MEDICINE

## 2022-03-22 PROCEDURE — 80048 BASIC METABOLIC PNL TOTAL CA: CPT

## 2022-03-22 PROCEDURE — 85610 PROTHROMBIN TIME: CPT

## 2022-03-22 PROCEDURE — 71260 CT THORAX DX C+: CPT

## 2022-03-22 PROCEDURE — 6360000004 HC RX CONTRAST MEDICATION: Performed by: EMERGENCY MEDICINE

## 2022-03-22 PROCEDURE — 96375 TX/PRO/DX INJ NEW DRUG ADDON: CPT

## 2022-03-22 PROCEDURE — 2580000003 HC RX 258: Performed by: EMERGENCY MEDICINE

## 2022-03-22 PROCEDURE — 74177 CT ABD & PELVIS W/CONTRAST: CPT

## 2022-03-22 PROCEDURE — 96374 THER/PROPH/DIAG INJ IV PUSH: CPT

## 2022-03-22 PROCEDURE — 84484 ASSAY OF TROPONIN QUANT: CPT

## 2022-03-22 PROCEDURE — 6370000000 HC RX 637 (ALT 250 FOR IP): Performed by: EMERGENCY MEDICINE

## 2022-03-22 PROCEDURE — 83880 ASSAY OF NATRIURETIC PEPTIDE: CPT

## 2022-03-22 PROCEDURE — 83690 ASSAY OF LIPASE: CPT

## 2022-03-22 PROCEDURE — 99285 EMERGENCY DEPT VISIT HI MDM: CPT

## 2022-03-22 PROCEDURE — 93005 ELECTROCARDIOGRAM TRACING: CPT | Performed by: EMERGENCY MEDICINE

## 2022-03-22 PROCEDURE — 81003 URINALYSIS AUTO W/O SCOPE: CPT

## 2022-03-22 PROCEDURE — 85025 COMPLETE CBC W/AUTO DIFF WBC: CPT

## 2022-03-22 PROCEDURE — 85730 THROMBOPLASTIN TIME PARTIAL: CPT

## 2022-03-22 PROCEDURE — 85379 FIBRIN DEGRADATION QUANT: CPT

## 2022-03-22 PROCEDURE — 36415 COLL VENOUS BLD VENIPUNCTURE: CPT

## 2022-03-22 RX ORDER — ASPIRIN 81 MG/1
324 TABLET, CHEWABLE ORAL ONCE
Status: COMPLETED | OUTPATIENT
Start: 2022-03-22 | End: 2022-03-22

## 2022-03-22 RX ORDER — 0.9 % SODIUM CHLORIDE 0.9 %
70 INTRAVENOUS SOLUTION INTRAVENOUS ONCE
Status: COMPLETED | OUTPATIENT
Start: 2022-03-22 | End: 2022-03-22

## 2022-03-22 RX ORDER — SODIUM CHLORIDE 0.9 % (FLUSH) 0.9 %
5-40 SYRINGE (ML) INJECTION PRN
Status: DISCONTINUED | OUTPATIENT
Start: 2022-03-22 | End: 2022-03-22 | Stop reason: HOSPADM

## 2022-03-22 RX ORDER — 0.9 % SODIUM CHLORIDE 0.9 %
1000 INTRAVENOUS SOLUTION INTRAVENOUS ONCE
Status: COMPLETED | OUTPATIENT
Start: 2022-03-22 | End: 2022-03-22

## 2022-03-22 RX ORDER — FENTANYL CITRATE 50 UG/ML
50 INJECTION, SOLUTION INTRAMUSCULAR; INTRAVENOUS ONCE
Status: COMPLETED | OUTPATIENT
Start: 2022-03-22 | End: 2022-03-22

## 2022-03-22 RX ADMIN — ASPIRIN 81 MG 324 MG: 81 TABLET ORAL at 02:45

## 2022-03-22 RX ADMIN — HYDROMORPHONE HYDROCHLORIDE 1 MG: 1 INJECTION, SOLUTION INTRAMUSCULAR; INTRAVENOUS; SUBCUTANEOUS at 03:52

## 2022-03-22 RX ADMIN — FENTANYL CITRATE 50 MCG: 0.05 INJECTION, SOLUTION INTRAMUSCULAR; INTRAVENOUS at 02:46

## 2022-03-22 RX ADMIN — SODIUM CHLORIDE 1000 ML: 9 INJECTION, SOLUTION INTRAVENOUS at 03:00

## 2022-03-22 RX ADMIN — SODIUM CHLORIDE 70 ML: 9 INJECTION, SOLUTION INTRAVENOUS at 03:22

## 2022-03-22 RX ADMIN — IOPAMIDOL 100 ML: 755 INJECTION, SOLUTION INTRAVENOUS at 03:22

## 2022-03-22 RX ADMIN — Medication 10 ML: at 03:23

## 2022-03-22 ASSESSMENT — PAIN DESCRIPTION - PAIN TYPE: TYPE: ACUTE PAIN

## 2022-03-22 ASSESSMENT — PAIN DESCRIPTION - ONSET: ONSET: ON-GOING

## 2022-03-22 ASSESSMENT — HEART SCORE: ECG: 0

## 2022-03-22 ASSESSMENT — PAIN SCALES - GENERAL
PAINLEVEL_OUTOF10: 9
PAINLEVEL_OUTOF10: 9

## 2022-03-22 ASSESSMENT — PAIN DESCRIPTION - LOCATION: LOCATION: ABDOMEN

## 2022-03-22 ASSESSMENT — PAIN DESCRIPTION - FREQUENCY: FREQUENCY: CONTINUOUS

## 2022-03-22 ASSESSMENT — PAIN DESCRIPTION - ORIENTATION: ORIENTATION: LEFT

## 2022-03-22 ASSESSMENT — PAIN DESCRIPTION - PROGRESSION: CLINICAL_PROGRESSION: NOT CHANGED

## 2022-03-22 NOTE — ED PROVIDER NOTES
[facility]  [facility]  Phone: [phone]      Pt Name: Stacey Mcelroy  [de-identified]  [place] 1970  Date of evaluation: 3/22/2022      CHIEF COMPLAINT       Chief Complaint   Patient presents with    Abdominal Pain    Chest Pain       HISTORY OF PRESENT ILLNESS   Stacey Mcelroy is a 47 y.o. male with history of hypertension, hyperlipidemia, type 2 diabetes on insulin, and CAD who presents for evaluation of chest pain and abdominal pain. The patient is Portuguese speaking only and history is obtained using a video . The patient reports that starting around 8 PM tonight he developed gradual onset, constant, progressive, sharp, stabbing, left upper and left lower abdominal pain with associated left-sided chest pressure that he feels is being caused from the pressure in his abdomen. He also complains of 4 episodes of watery nonbloody diarrhea. He took some pain medicine at home prior to arrival but is unsure of what he took. He does not list any provoking or palliating factors. The patient states that he has history of cholecystectomy but denies any other abdominal surgeries. He denies recent antibiotic use or recent travel. The patient does have history of an MI that he reports was 20 years ago but did not require a stent. He thinks that his last cardiac cath was approximately 3 years ago and he is unsure of the date of his last stress test.  His cardiologist is Dr. Christie Weber. The patient states that he does have a pacemaker. He denies fever, chills, headache, vision changes, neck pain, back pain, chest injury, abdominal trauma, urinary symptoms, penile pain, scrotal pain, genital lesions, nausea, vomiting, diaphoresis, dizziness, lightheadedness, syncope, focal weakness, numbness, tingling, recent injury or illness.     REVIEW OF SYSTEMS     Ten point review of systems was reviewed and is negative unless otherwise noted in the HPI    PAST MEDICAL HISTORY    has a past medical history of CAD (coronary artery disease), Diabetes mellitus ([facility] [address] Hyperlipidemia, Hypertension, and MI (myocardial infarction) ([facility] [address] SURGICAL HISTORY      has a past surgical history that includes Cholecystectomy. CURRENT MEDICATIONS       Previous Medications    ASPIRIN 81 MG TABLET    Take 81 mg by mouth daily    ATORVASTATIN (LIPITOR) 20 MG TABLET    Take 40 mg by mouth daily     CHOLECALCIFEROL (VITAMIN D3) 5000 UNITS TABS    Take by mouth    FLUTICASONE (FLONASE) 50 MCG/ACT NASAL SPRAY    1 spray by Nasal route daily    FUROSEMIDE (LASIX) 20 MG TABLET    Take 20 mg by mouth daily    GABAPENTIN (NEURONTIN) 300 MG CAPSULE    Take 300 mg by mouth 3 times daily. GLYBURIDE (DIABETA) 5 MG TABLET    Take 5 mg by mouth daily (with breakfast)    INSULIN GLARGINE (BASAGLAR KWIKPEN) 100 UNIT/ML INJECTION PEN    Inject into the skin nightly    KETOTIFEN (ZADITOR) 0.025 % OPHTHALMIC SOLUTION    1 drop 2 times daily    LIRAGLUTIDE (VICTOZA) 18 MG/3ML SOPN SC INJECTION    Inject 1.2 mg into the skin daily    LISINOPRIL (PRINIVIL;ZESTRIL) 10 MG TABLET    Take 5 mg by mouth daily     LORATADINE (CLARITIN) 10 MG TABLET    Take 10 mg by mouth daily    METOPROLOL TARTRATE (LOPRESSOR) 25 MG TABLET    Take 25 mg by mouth 2 times daily    SERTRALINE (ZOLOFT) 50 MG TABLET    Take 50 mg by mouth daily    SITAGLIPTIN (JANUVIA) 100 MG TABLET    Take 100 mg by mouth daily       ALLERGIES     has No Known Allergies. FAMILY HISTORY     has no family status information on file. family history is not on file. SOCIAL HISTORY      reports that he quit smoking about 9 years ago. He has never used smokeless tobacco. He reports that he does not drink alcohol and does not use drugs. PHYSICAL EXAM     INITIAL VITALS:  height is 5' 6.93\" (1.7 m) and weight is 90.7 kg (200 lb). His oral temperature is 98.1 °F (36.7 °C). His blood pressure is 122/74 and his pulse is 87.  His respiration is 18 and oxygen saturation is 97%. CONSTITUTIONAL: Appears uncomfortable, nontoxic  SKIN: warm, dry, no jaundice, hives or petechiae  EYES: clear conjunctiva, non-icteric sclera  HENT: normocephalic, atraumatic, moist mucus membranes  NECK: Nontender and supple with no nuchal rigidity, full range of motion  PULMONARY: clear to auscultation without wheezes, rhonchi, or rales, normal excursion, no accessory muscle use and no stridor  CARDIOVASCULAR: regular rate, rhythm. Strong radial pulses with intact distal perfusion. Capillary refill <2 seconds. GASTROINTESTINAL: soft, obese, generalized tenderness to palpation with increased tenderness over the left upper quadrant and left lower quadrant, no pain over McBurney's point, negative Dillard sign, no pulsatile mass, non-distended, no palpable masses, no rebound or guarding   GENITOURINARY: No costovertebral angle tenderness to palpation  MUSCULOSKELETAL: No midline spinal tenderness, step off or deformity. Extremities are otherwise nontender to palpation and nonerythematous. Compartments soft. No peripheral edema. NEUROLOGIC: alert and oriented x 3, GCS 15, normal mentation and speech.  Moves all extremities x 4 without motor or sensory deficit, gait is stable without ataxia  PSYCHIATRIC: normal mood and affect, thought process is clear and linear    DIAGNOSTIC RESULTS     EKG:  EKG 228am Sinus rhythm, rate 90 bpm, normal axis, normal intervals, no ST elevation or depression, no T wave inversions, T wave flattening in V4, V5, V6, 1 and aVL, poor R wave progression, Q wave in aVR, no change from EKG on 9/15/2019    EKG 4:33 AM sinus rhythm, rate 84 bpm, normal axis, normal intervals, no ST elevation or depression, T wave flattening in 1 and aVL, poor R wave progression, Q wave in aVR, no change from first EKG    RADIOLOGY:   CT ABDOMEN PELVIS W IV CONTRAST    Result Date: 3/22/2022  EXAMINATION: CTA OF THE CHEST; CT OF THE ABDOMEN AND PELVIS WITH CONTRAST 3/22/2022 3:13 am TECHNIQUE: CTA of the chest was performed after the administration of intravenous contrast.  Multiplanar reformatted images are provided for review. MIP images are provided for review. Dose modulation, iterative reconstruction, and/or weight based adjustment of the mA/kV was utilized to reduce the radiation dose to as low as reasonably achievable.; CT of the abdomen and pelvis was performed with the administration of intravenous contrast. Multiplanar reformatted images are provided for review. Dose modulation, iterative reconstruction, and/or weight based adjustment of the mA/kV was utilized to reduce the radiation dose to as low as reasonably achievable. COMPARISON: CT PA dated 09/15/2013. HISTORY: ORDERING SYSTEM PROVIDED HISTORY: chest pain, elevated d-dimer TECHNOLOGIST PROVIDED HISTORY: chest pain, elevated d-dimer Decision Support Exception - unselect if not a suspected or confirmed emergency medical condition->Emergency Medical Condition (MA) Reason for Exam: Left chest pain, elevated Ddimer and Left abdomen pain FINDINGS: CTPA: Pulmonary Arteries: Pulmonary arteries are adequately opacified for evaluation. No evidence of intraluminal filling defect to suggest pulmonary embolism. Main pulmonary artery is normal in caliber. Mediastinum: No evidence of mediastinal lymphadenopathy. The heart and pericardium demonstrate no acute abnormality. There is no acute abnormality of the thoracic aorta. Lungs/pleura: The lungs are without acute process. No focal consolidation or pulmonary edema. No evidence of pleural effusion or pneumothorax. Soft Tissues/Bones: No acute bone or soft tissue abnormality. ABDOMEN/PELVIS: Organs:  Liver enhances normally without evidence of intrahepatic biliary ductal dilatation. Status post cholecystectomy The spleen, pancreas and adrenal glands are unremarkable.   The kidneys and ureters are noted for mildly heterogeneous hypodensity at medial aspect of left upper pole measuring 1.4 x 0.9 x 2.1 cm. GI/Bowel: Stomach and duodenal sweep demonstrate no acute abnormality. There is no evidence of bowel obstruction. No evidence of abnormal bowel wall thickening or distension. The appendix is visualized and is unremarkable. No evidence of acute appendicitis. Pelvis: The bladder and reproductive organs are unremarkable. Peritoneum/Retroperitoneum: No evidence of ascites or free air. No evidence of lymphadenopathy. Aorta is normal in caliber. Bones/Soft Tissues: No acute bone or soft tissue abnormality. No evidence of pulmonary embolism or acute pulmonary abnormality. Mildly heterogeneous hypodensity at the medial aspect of the left upper pole measuring 1.4 x 0.9 x 2.1 cm. This most likely represents a cyst.  Follow-up ultrasound or renal mass protocol CT recommended for further evaluation. CT CHEST PULMONARY EMBOLISM W CONTRAST    Result Date: 3/22/2022  EXAMINATION: CTA OF THE CHEST; CT OF THE ABDOMEN AND PELVIS WITH CONTRAST 3/22/2022 3:13 am TECHNIQUE: CTA of the chest was performed after the administration of intravenous contrast.  Multiplanar reformatted images are provided for review. MIP images are provided for review. Dose modulation, iterative reconstruction, and/or weight based adjustment of the mA/kV was utilized to reduce the radiation dose to as low as reasonably achievable.; CT of the abdomen and pelvis was performed with the administration of intravenous contrast. Multiplanar reformatted images are provided for review. Dose modulation, iterative reconstruction, and/or weight based adjustment of the mA/kV was utilized to reduce the radiation dose to as low as reasonably achievable. COMPARISON: CT PA dated 09/15/2013.  HISTORY: ORDERING SYSTEM PROVIDED HISTORY: chest pain, elevated d-dimer TECHNOLOGIST PROVIDED HISTORY: chest pain, elevated d-dimer Decision Support Exception - unselect if not a suspected or confirmed emergency medical condition->Emergency Medical Condition (MA) Reason for Exam: Left chest pain, elevated Ddimer and Left abdomen pain FINDINGS: CTPA: Pulmonary Arteries: Pulmonary arteries are adequately opacified for evaluation. No evidence of intraluminal filling defect to suggest pulmonary embolism. Main pulmonary artery is normal in caliber. Mediastinum: No evidence of mediastinal lymphadenopathy. The heart and pericardium demonstrate no acute abnormality. There is no acute abnormality of the thoracic aorta. Lungs/pleura: The lungs are without acute process. No focal consolidation or pulmonary edema. No evidence of pleural effusion or pneumothorax. Soft Tissues/Bones: No acute bone or soft tissue abnormality. ABDOMEN/PELVIS: Organs:  Liver enhances normally without evidence of intrahepatic biliary ductal dilatation. Status post cholecystectomy The spleen, pancreas and adrenal glands are unremarkable. The kidneys and ureters are noted for mildly heterogeneous hypodensity at medial aspect of left upper pole measuring 1.4 x 0.9 x 2.1 cm. GI/Bowel: Stomach and duodenal sweep demonstrate no acute abnormality. There is no evidence of bowel obstruction. No evidence of abnormal bowel wall thickening or distension. The appendix is visualized and is unremarkable. No evidence of acute appendicitis. Pelvis: The bladder and reproductive organs are unremarkable. Peritoneum/Retroperitoneum: No evidence of ascites or free air. No evidence of lymphadenopathy. Aorta is normal in caliber. Bones/Soft Tissues: No acute bone or soft tissue abnormality. No evidence of pulmonary embolism or acute pulmonary abnormality. Mildly heterogeneous hypodensity at the medial aspect of the left upper pole measuring 1.4 x 0.9 x 2.1 cm. This most likely represents a cyst.  Follow-up ultrasound or renal mass protocol CT recommended for further evaluation.        LABS:  Results for orders placed or performed during the hospital encounter of 03/22/22   CBC with Auto Differential   Result Value Ref Range    WBC 10.5 3.5 - 11.0 k/uL    RBC 6.05 (H) 4.5 - 5.9 m/uL    Hemoglobin 12.5 (L) 13.5 - 17.5 g/dL    Hematocrit 40.2 (L) 41 - 53 %    MCV 66.5 (L) 80 - 100 fL    MCH 20.7 (L) 26 - 34 pg    MCHC 31.1 31 - 37 g/dL    RDW 16.8 (H) 12.5 - 15.4 %    Platelets 310 917 - 622 k/uL    MPV 8.3 6.0 - 12.0 fL    Seg Neutrophils 78 (H) 36 - 66 %    Lymphocytes 12 (L) 24 - 44 %    Atypical Lymphocytes 4 %    Monocytes 4 1 - 7 %    Eosinophils % 1 1 - 4 %    Basophils 1 0 - 2 %    Segs Absolute 8.18 (H) 1.8 - 7.7 k/uL    Absolute Lymph # 1.26 1.0 - 4.8 k/uL    Atypical Lymphocytes Absolute 0.42 k/uL    Absolute Mono # 0.42 0.1 - 0.8 k/uL    Absolute Eos # 0.11 0.0 - 0.4 k/uL    Basophils Absolute 0.11 0.0 - 0.2 k/uL    Morphology MICROCYTOSIS PRESENT     Morphology ANISOCYTOSIS PRESENT     Morphology HYPOCHROMIA PRESENT     Morphology LARGE PLATELETS PRESENT    Basic Metabolic Panel w/ Reflex to MG   Result Value Ref Range    Glucose 150 (H) 70 - 99 mg/dL    BUN 11 6 - 20 mg/dL    CREATININE 0.70 0.70 - 1.20 mg/dL    Calcium 8.1 (L) 8.6 - 10.4 mg/dL    Sodium 138 135 - 144 mmol/L    Potassium 4.3 3.7 - 5.3 mmol/L    Chloride 101 98 - 107 mmol/L    CO2 24 20 - 31 mmol/L    Anion Gap 13 9 - 17 mmol/L    GFR Non-African American >60 >60 mL/min    GFR African American >60 >60 mL/min    GFR Comment         Lipase   Result Value Ref Range    Lipase 33 13 - 60 U/L   Troponin   Result Value Ref Range    Troponin, High Sensitivity 26 (H) 0 - 22 ng/L   Brain Natriuretic Peptide   Result Value Ref Range    Pro- (H) <300 pg/mL   Protime-INR   Result Value Ref Range    Protime 11.8 9.4 - 12.6 sec    INR 1.1    APTT   Result Value Ref Range    PTT 25.2 21.3 - 31.3 sec   Urinalysis with Reflex to Culture    Specimen: Urine   Result Value Ref Range    Color, UA Yellow Yellow    Turbidity UA Clear Clear    Glucose, Ur NEGATIVE NEGATIVE    Bilirubin Urine NEGATIVE NEGATIVE    Ketones, Urine NEGATIVE NEGATIVE    Specific Gravity, UA 1.023 1.005 - 1.030    Urine Hgb NEGATIVE NEGATIVE    pH, UA 5.5 5.0 - 8.0    Protein, UA NEGATIVE NEGATIVE    Urobilinogen, Urine Normal Normal    Nitrite, Urine NEGATIVE NEGATIVE    Leukocyte Esterase, Urine NEGATIVE NEGATIVE    Urinalysis Comments       Microscopic exam not performed based on chemical results unless requested in original order. Urinalysis Comments          Urinalysis Comments       Utilizing a urinalysis as the only screening method to exclude a potential uropathogen can be unreliable in many patient populations. Rapid screening tests are less sensitive than culture and if UTI is a clinical possibility, culture should be considered despite a negative urinalysis.    D-Dimer, Quantitative   Result Value Ref Range    D-Dimer, Quant 3.28 mg/L FEU   Troponin   Result Value Ref Range    Troponin, High Sensitivity 25 (H) 0 - 22 ng/L   EKG 12 Lead   Result Value Ref Range    Ventricular Rate 90 BPM    Atrial Rate 90 BPM    P-R Interval 120 ms    QRS Duration 78 ms    Q-T Interval 354 ms    QTc Calculation (Bazett) 433 ms    P Axis 51 degrees    R Axis -4 degrees    T Axis 81 degrees   EKG 12 Lead   Result Value Ref Range    Ventricular Rate 84 BPM    Atrial Rate 84 BPM    P-R Interval 148 ms    QRS Duration 86 ms    Q-T Interval 356 ms    QTc Calculation (Bazett) 420 ms    P Axis 44 degrees    R Axis -6 degrees    T Axis 84 degrees       EMERGENCY DEPARTMENT COURSE:        The patient was given the following medications:  Orders Placed This Encounter   Medications    0.9 % sodium chloride bolus    fentaNYL (SUBLIMAZE) injection 50 mcg    aspirin chewable tablet 324 mg    iopamidol (ISOVUE-370) 76 % injection 100 mL    0.9 % sodium chloride bolus    sodium chloride flush 0.9 % injection 5-40 mL    HYDROmorphone (DILAUDID) injection 1 mg        Vitals:    Vitals:    03/22/22 0231   BP: 122/74   Pulse: 87   Resp: 18   Temp: 98.1 °F (36.7 °C)   TempSrc: Oral   SpO2: 97%   Weight: 90.7 kg (200 lb)   Height: 5' 6.93\" (1.7 m)     -------------------------  BP: 122/74, Temp: 98.1 °F (36.7 °C), Pulse: 87, Resp: 18    CONSULTS:  None    CRITICAL CARE:   None    PROCEDURES:  None    Heart Score    Heart Score for chest pain patients  History: Slightly Suspicious  ECG: Normal  Patient Age: > 39 and < 65 years  Risk Factors: > 3 Risk factors or history of atherosclerotic disease*  Troponin: < 1X normal limit  Heart Score Total: 3    Score 0 - 3 = 2.5%  MACE over next 6 wks = Discharge home  Score 4 - 6 = 20.3%  MACE over next 6 wks = Obs admit  Score 7 - 10 = 72.7%  MACE over next 6 wks = Early invasive Rx      DIAGNOSIS/ MDM:   Karen Kern is a 47 y.o. male who presents with abdominal pain and chest pain. I communicated with the patient using a video . Vital signs are stable. Heart regular rate and rhythm. Lungs clear to auscultation. Abdomen soft with left upper quadrant and left lower quadrant tenderness to palpation. Radial pulses 2+/4 and equal bilaterally. Capillary refill less than 2 seconds. His pain completely resolved after fentanyl and Dilaudid. EKG x2 shows sinus rhythm without any ST elevation or depression and is unchanged from baseline. BNP is slightly elevated at 368 but he does not clinically have any signs of fluid overload. Troponins slightly elevated at 26 and then 25. I spoke to the patient's cardiologist on-call, Dr. Naveed Jones, at 4:58am who recommends having the patient follow-up outpatient and he does not feel that he needs admission at this time. Urinalysis, CBC, BMP, LFTs, lipase and coags are unremarkable. D-dimer is elevated but CT pulmonary embolism study and CT abdomen pelvis with IV contrast showed no acute process. He does have an incidental finding of a left renal cyst and he was updated on this.  The patient understands that at this time there is no evidence for a more malignant underlying process, but also understands that early in the process of an illness or injury, an emergency department work-up can be falsely reassuring. Routine discharge counseling was given, and the patient understands that worsening, changing or persistent symptoms should prompt a immediate call or follow-up with their primary care physician or return to the emergency department. The importance of appropriate follow-up was also discussed. I have reviewed the disposition diagnosis with the patient. I have answered their questions and given discharge instructions. They voiced understanding of these instructions and did not have any further questions or complaints. FINAL IMPRESSION      1. Left lower quadrant abdominal pain    2. Diarrhea, unspecified type    3. Chest pain, unspecified type    4.  Renal cyst, left          DISPOSITION/PLAN   DISPOSITION          PATIENT REFERRED TO:  Madhu Redd MD  [address] [facility]  [phone]    Schedule an appointment as soon as possible for a visit in 1 day      Lalit Alan MD  [address] [facility]  [phone]    Schedule an appointment as soon as possible for a visit in 1 day      [facility] ED  [place] [place]   [phone]  Go to   If symptoms worsen      DISCHARGE MEDICATIONS:  New Prescriptions    No medications on file       (Please note that portions of this note were completed with a voice recognitionprogram.  Efforts were made to edit the dictations but occasionally words are mis-transcribed.)    Katie Morataya DO, [facility]  Emergency Physician Attending         Katie Morataya DO  03/22/22 8090

## 2022-03-22 NOTE — ED NOTES
Pt presents to ED c/o abdominal pain/chest pressure that began around 8pm this evening. Pt states that he wasn't doing anything prior to onset of his symptoms other than eating dinner. Pt reports a history of myocardial infarct and pacemaker placement. Pt states that his chest feels heavy and rates his pain at 9/10. Pt arrives A/Ox4, PWD, PMS intact. Pt resting on stretcher with family at bedside and call light in reach. French  was used via iPad to perform triage and assessment.      Rosemarie Tijerina RN  03/22/22 0233

## 2022-12-07 ENCOUNTER — APPOINTMENT (OUTPATIENT)
Dept: GENERAL RADIOLOGY | Age: 52
DRG: 201 | End: 2022-12-07
Payer: COMMERCIAL

## 2022-12-07 ENCOUNTER — HOSPITAL ENCOUNTER (INPATIENT)
Age: 52
LOS: 1 days | Discharge: HOME | DRG: 201 | End: 2022-12-09
Attending: EMERGENCY MEDICINE | Admitting: INTERNAL MEDICINE
Payer: COMMERCIAL

## 2022-12-07 DIAGNOSIS — I47.20 VENTRICULAR TACHYCARDIA: Primary | ICD-10-CM

## 2022-12-07 LAB
ABSOLUTE EOS #: 0.15 K/UL (ref 0–0.4)
ABSOLUTE IMMATURE GRANULOCYTE: 0 K/UL (ref 0–0.3)
ABSOLUTE LYMPH #: 2.05 K/UL (ref 1–4.8)
ABSOLUTE MONO #: 0.61 K/UL (ref 0.2–0.8)
ANION GAP SERPL CALCULATED.3IONS-SCNC: 11 MMOL/L (ref 9–17)
BASOPHILS # BLD: 1 %
BASOPHILS ABSOLUTE: 0.08 K/UL (ref 0–0.2)
BUN BLDV-MCNC: 12 MG/DL (ref 6–20)
BUN/CREAT BLD: 15 (ref 9–20)
CALCIUM SERPL-MCNC: 8.5 MG/DL (ref 8.6–10.4)
CHLORIDE BLD-SCNC: 101 MMOL/L (ref 98–107)
CO2: 24 MMOL/L (ref 20–31)
CREAT SERPL-MCNC: 0.79 MG/DL (ref 0.7–1.2)
EOSINOPHILS RELATIVE PERCENT: 2 % (ref 1–4)
GFR SERPL CREATININE-BSD FRML MDRD: >60 ML/MIN/1.73M2
GLUCOSE BLD-MCNC: 196 MG/DL (ref 70–99)
HCT VFR BLD CALC: 44.7 % (ref 40.7–50.3)
HEMOGLOBIN: 12.6 G/DL (ref 13–17)
IMMATURE GRANULOCYTES: 0 %
LYMPHOCYTES # BLD: 27 % (ref 24–44)
MCH RBC QN AUTO: 20.3 PG (ref 25.2–33.5)
MCHC RBC AUTO-ENTMCNC: 28.2 G/DL (ref 28.4–34.8)
MCV RBC AUTO: 71.9 FL (ref 82.6–102.9)
MONOCYTES # BLD: 8 % (ref 1–7)
MORPHOLOGY: ABNORMAL
MYOGLOBIN: 101 NG/ML (ref 28–72)
NRBC AUTOMATED: 0 PER 100 WBC
PDW BLD-RTO: 19.8 % (ref 11.8–14.4)
PLATELET # BLD: 308 K/UL (ref 138–453)
PMV BLD AUTO: 10.7 FL (ref 8.1–13.5)
POTASSIUM SERPL-SCNC: 3.6 MMOL/L (ref 3.7–5.3)
RBC # BLD: 6.22 M/UL (ref 4.21–5.77)
SEG NEUTROPHILS: 62 % (ref 36–66)
SEGMENTED NEUTROPHILS ABSOLUTE COUNT: 4.71 K/UL (ref 1.8–7.7)
SODIUM BLD-SCNC: 136 MMOL/L (ref 135–144)
TROPONIN, HIGH SENSITIVITY: 16 NG/L (ref 0–22)
WBC # BLD: 7.6 K/UL (ref 3.5–11.3)

## 2022-12-07 PROCEDURE — 71045 X-RAY EXAM CHEST 1 VIEW: CPT

## 2022-12-07 PROCEDURE — 99285 EMERGENCY DEPT VISIT HI MDM: CPT

## 2022-12-07 PROCEDURE — 93005 ELECTROCARDIOGRAM TRACING: CPT | Performed by: INTERNAL MEDICINE

## 2022-12-07 PROCEDURE — 80048 BASIC METABOLIC PNL TOTAL CA: CPT

## 2022-12-07 PROCEDURE — 84484 ASSAY OF TROPONIN QUANT: CPT

## 2022-12-07 PROCEDURE — 83874 ASSAY OF MYOGLOBIN: CPT

## 2022-12-07 PROCEDURE — 96374 THER/PROPH/DIAG INJ IV PUSH: CPT

## 2022-12-07 PROCEDURE — 85025 COMPLETE CBC W/AUTO DIFF WBC: CPT

## 2022-12-07 ASSESSMENT — PAIN - FUNCTIONAL ASSESSMENT: PAIN_FUNCTIONAL_ASSESSMENT: 0-10

## 2022-12-07 ASSESSMENT — ENCOUNTER SYMPTOMS
CONSTIPATION: 0
COUGH: 0
VOMITING: 0
COLOR CHANGE: 0
FACIAL SWELLING: 0
DIARRHEA: 0
ABDOMINAL PAIN: 0
EYE DISCHARGE: 0
SHORTNESS OF BREATH: 0
EYE REDNESS: 0

## 2022-12-07 ASSESSMENT — PAIN SCALES - GENERAL: PAINLEVEL_OUTOF10: 0

## 2022-12-08 PROBLEM — N52.9 IMPOTENCE OF ORGANIC ORIGIN: Status: ACTIVE | Noted: 2020-06-30

## 2022-12-08 PROBLEM — I82.90 ACUTE EMBOLISM AND THROMBOSIS OF UNSPECIFIED VEIN: Status: ACTIVE | Noted: 2022-06-08

## 2022-12-08 PROBLEM — A41.9 SEVERE SEPSIS (HCC): Status: ACTIVE | Noted: 2019-09-15

## 2022-12-08 PROBLEM — Z79.01 CHRONIC ANTICOAGULATION: Status: ACTIVE | Noted: 2022-12-08

## 2022-12-08 PROBLEM — I25.5 GENERALIZED ISCHEMIC MYOCARDIAL DYSFUNCTION: Status: ACTIVE | Noted: 2018-08-27

## 2022-12-08 PROBLEM — I21.4 NSTEMI (NON-ST ELEVATED MYOCARDIAL INFARCTION) (HCC): Status: ACTIVE | Noted: 2022-05-19

## 2022-12-08 PROBLEM — F33.2 SEVERE RECURRENT MAJOR DEPRESSION WITHOUT PSYCHOTIC FEATURES (HCC): Status: ACTIVE | Noted: 2019-09-20

## 2022-12-08 PROBLEM — I47.20 V TACH: Status: ACTIVE | Noted: 2022-12-08

## 2022-12-08 PROBLEM — E66.9 OBESITY WITH BODY MASS INDEX 30 OR GREATER: Status: ACTIVE | Noted: 2019-10-17

## 2022-12-08 PROBLEM — Z45.02 AICD DISCHARGE: Status: ACTIVE | Noted: 2022-12-08

## 2022-12-08 PROBLEM — E11.40 DIABETIC NEUROPATHY (HCC): Status: ACTIVE | Noted: 2020-01-10

## 2022-12-08 PROBLEM — Z72.0 CURRENT EVERY DAY NICOTINE VAPOR PRODUCT USER: Status: ACTIVE | Noted: 2022-12-08

## 2022-12-08 PROBLEM — I10 ESSENTIAL HYPERTENSION: Status: ACTIVE | Noted: 2017-12-22

## 2022-12-08 PROBLEM — E29.1 HYPOGONADISM IN MALE: Status: ACTIVE | Noted: 2021-09-21

## 2022-12-08 PROBLEM — R65.20 SEVERE SEPSIS (HCC): Status: ACTIVE | Noted: 2019-09-15

## 2022-12-08 PROBLEM — M65.30 ACQUIRED TRIGGER FINGER: Status: ACTIVE | Noted: 2021-04-08

## 2022-12-08 PROBLEM — E11.9 DM (DIABETES MELLITUS), TYPE 2 (HCC): Status: ACTIVE | Noted: 2017-11-16

## 2022-12-08 PROBLEM — R39.198 SLOWING OF URINARY STREAM: Status: ACTIVE | Noted: 2021-11-23

## 2022-12-08 PROBLEM — E78.5 HYPERLIPIDEMIA: Status: ACTIVE | Noted: 2017-12-22

## 2022-12-08 PROBLEM — I51.3 INTRACARDIAC THROMBOSIS, NOT ELSEWHERE CLASSIFIED: Status: ACTIVE | Noted: 2022-08-03

## 2022-12-08 PROBLEM — E13.40 NEUROPATHY DUE TO SECONDARY DIABETES MELLITUS (HCC): Status: ACTIVE | Noted: 2020-01-10

## 2022-12-08 LAB
ALBUMIN SERPL-MCNC: 3.7 G/DL (ref 3.5–5.2)
ALP BLD-CCNC: 131 U/L (ref 40–129)
ALT SERPL-CCNC: 15 U/L (ref 5–41)
ANION GAP SERPL CALCULATED.3IONS-SCNC: 11 MMOL/L (ref 9–17)
AST SERPL-CCNC: 18 U/L
BILIRUB SERPL-MCNC: 0.4 MG/DL (ref 0.3–1.2)
BUN BLDV-MCNC: 11 MG/DL (ref 6–20)
BUN/CREAT BLD: 12 (ref 9–20)
CALCIUM SERPL-MCNC: 8.6 MG/DL (ref 8.6–10.4)
CHLORIDE BLD-SCNC: 102 MMOL/L (ref 98–107)
CHOLESTEROL/HDL RATIO: 4.1
CHOLESTEROL: 110 MG/DL
CO2: 24 MMOL/L (ref 20–31)
CREAT SERPL-MCNC: 0.89 MG/DL (ref 0.7–1.2)
EKG ATRIAL RATE: 91 BPM
EKG P AXIS: 44 DEGREES
EKG P-R INTERVAL: 148 MS
EKG Q-T INTERVAL: 360 MS
EKG QRS DURATION: 86 MS
EKG QTC CALCULATION (BAZETT): 442 MS
EKG R AXIS: -29 DEGREES
EKG T AXIS: 99 DEGREES
EKG VENTRICULAR RATE: 91 BPM
ESTIMATED AVERAGE GLUCOSE: 177 MG/DL
ESTIMATED AVERAGE GLUCOSE: 180 MG/DL
GFR SERPL CREATININE-BSD FRML MDRD: >60 ML/MIN/1.73M2
GLUCOSE BLD-MCNC: 121 MG/DL (ref 70–99)
GLUCOSE BLD-MCNC: 170 MG/DL (ref 75–110)
GLUCOSE BLD-MCNC: 199 MG/DL (ref 75–110)
GLUCOSE BLD-MCNC: 251 MG/DL (ref 75–110)
GLUCOSE BLD-MCNC: 93 MG/DL (ref 75–110)
HBA1C MFR BLD: 7.8 % (ref 4–6)
HBA1C MFR BLD: 7.9 % (ref 4–6)
HCT VFR BLD CALC: 44.3 % (ref 40.7–50.3)
HDLC SERPL-MCNC: 27 MG/DL
HEMOGLOBIN: 12.3 G/DL (ref 13–17)
INR BLD: 1.9
LDL CHOLESTEROL: 42 MG/DL (ref 0–130)
MAGNESIUM: 2.2 MG/DL (ref 1.6–2.6)
MCH RBC QN AUTO: 20.2 PG (ref 25.2–33.5)
MCHC RBC AUTO-ENTMCNC: 27.8 G/DL (ref 28.4–34.8)
MCV RBC AUTO: 72.6 FL (ref 82.6–102.9)
MYOGLOBIN: 67 NG/ML (ref 28–72)
MYOGLOBIN: 73 NG/ML (ref 28–72)
NRBC AUTOMATED: 0 PER 100 WBC
PDW BLD-RTO: 19.9 % (ref 11.8–14.4)
PLATELET # BLD: 287 K/UL (ref 138–453)
PMV BLD AUTO: 10.5 FL (ref 8.1–13.5)
POTASSIUM SERPL-SCNC: 3.5 MMOL/L (ref 3.7–5.3)
PROTHROMBIN TIME: 21.4 SEC (ref 11.5–14.2)
RBC # BLD: 6.1 M/UL (ref 4.21–5.77)
SODIUM BLD-SCNC: 137 MMOL/L (ref 135–144)
TOTAL PROTEIN: 7 G/DL (ref 6.4–8.3)
TRIGL SERPL-MCNC: 205 MG/DL
TROPONIN, HIGH SENSITIVITY: 19 NG/L (ref 0–22)
TROPONIN, HIGH SENSITIVITY: 22 NG/L (ref 0–22)
TROPONIN, HIGH SENSITIVITY: 23 NG/L (ref 0–22)
WBC # BLD: 6.8 K/UL (ref 3.5–11.3)

## 2022-12-08 PROCEDURE — APPSS45 APP SPLIT SHARED TIME 31-45 MINUTES: Performed by: NURSE PRACTITIONER

## 2022-12-08 PROCEDURE — 85610 PROTHROMBIN TIME: CPT

## 2022-12-08 PROCEDURE — 82947 ASSAY GLUCOSE BLOOD QUANT: CPT

## 2022-12-08 PROCEDURE — 93010 ELECTROCARDIOGRAM REPORT: CPT | Performed by: INTERNAL MEDICINE

## 2022-12-08 PROCEDURE — 84484 ASSAY OF TROPONIN QUANT: CPT

## 2022-12-08 PROCEDURE — 2580000003 HC RX 258: Performed by: NURSE PRACTITIONER

## 2022-12-08 PROCEDURE — 80053 COMPREHEN METABOLIC PANEL: CPT

## 2022-12-08 PROCEDURE — 6370000000 HC RX 637 (ALT 250 FOR IP): Performed by: INTERNAL MEDICINE

## 2022-12-08 PROCEDURE — 6360000002 HC RX W HCPCS: Performed by: NURSE PRACTITIONER

## 2022-12-08 PROCEDURE — 83036 HEMOGLOBIN GLYCOSYLATED A1C: CPT

## 2022-12-08 PROCEDURE — 2000000000 HC ICU R&B

## 2022-12-08 PROCEDURE — 6360000002 HC RX W HCPCS: Performed by: INTERNAL MEDICINE

## 2022-12-08 PROCEDURE — C8929 TTE W OR WO FOL WCON,DOPPLER: HCPCS

## 2022-12-08 PROCEDURE — 80061 LIPID PANEL: CPT

## 2022-12-08 PROCEDURE — 85027 COMPLETE CBC AUTOMATED: CPT

## 2022-12-08 PROCEDURE — 36415 COLL VENOUS BLD VENIPUNCTURE: CPT

## 2022-12-08 PROCEDURE — 83874 ASSAY OF MYOGLOBIN: CPT

## 2022-12-08 PROCEDURE — 6370000000 HC RX 637 (ALT 250 FOR IP): Performed by: NURSE PRACTITIONER

## 2022-12-08 PROCEDURE — 83735 ASSAY OF MAGNESIUM: CPT

## 2022-12-08 PROCEDURE — 2580000003 HC RX 258: Performed by: INTERNAL MEDICINE

## 2022-12-08 PROCEDURE — 99223 1ST HOSP IP/OBS HIGH 75: CPT | Performed by: INTERNAL MEDICINE

## 2022-12-08 PROCEDURE — 6360000002 HC RX W HCPCS: Performed by: EMERGENCY MEDICINE

## 2022-12-08 RX ORDER — ATORVASTATIN CALCIUM 40 MG/1
40 TABLET, FILM COATED ORAL DAILY
Status: DISCONTINUED | OUTPATIENT
Start: 2022-12-08 | End: 2022-12-09 | Stop reason: HOSPADM

## 2022-12-08 RX ORDER — MONTELUKAST SODIUM 10 MG/1
10 TABLET ORAL NIGHTLY
COMMUNITY

## 2022-12-08 RX ORDER — ACETAMINOPHEN 325 MG/1
650 TABLET ORAL EVERY 6 HOURS PRN
Status: DISCONTINUED | OUTPATIENT
Start: 2022-12-08 | End: 2022-12-09 | Stop reason: HOSPADM

## 2022-12-08 RX ORDER — OMEPRAZOLE 20 MG/1
20 CAPSULE, DELAYED RELEASE ORAL DAILY
COMMUNITY

## 2022-12-08 RX ORDER — WARFARIN SODIUM 4 MG/1
4-6 TABLET ORAL SEE ADMIN INSTRUCTIONS
Status: ON HOLD | COMMUNITY
End: 2022-12-09 | Stop reason: HOSPADM

## 2022-12-08 RX ORDER — GABAPENTIN 300 MG/1
300 CAPSULE ORAL 3 TIMES DAILY
Status: DISCONTINUED | OUTPATIENT
Start: 2022-12-08 | End: 2022-12-09 | Stop reason: HOSPADM

## 2022-12-08 RX ORDER — ONDANSETRON 4 MG/1
4 TABLET, ORALLY DISINTEGRATING ORAL EVERY 8 HOURS PRN
Status: DISCONTINUED | OUTPATIENT
Start: 2022-12-08 | End: 2022-12-09 | Stop reason: HOSPADM

## 2022-12-08 RX ORDER — MAGNESIUM SULFATE 1 G/100ML
1000 INJECTION INTRAVENOUS PRN
Status: DISCONTINUED | OUTPATIENT
Start: 2022-12-08 | End: 2022-12-09 | Stop reason: HOSPADM

## 2022-12-08 RX ORDER — FLUTICASONE PROPIONATE 50 MCG
1 SPRAY, SUSPENSION (ML) NASAL DAILY
Status: DISCONTINUED | OUTPATIENT
Start: 2022-12-08 | End: 2022-12-09 | Stop reason: HOSPADM

## 2022-12-08 RX ORDER — ACETAMINOPHEN 650 MG/1
650 SUPPOSITORY RECTAL EVERY 6 HOURS PRN
Status: DISCONTINUED | OUTPATIENT
Start: 2022-12-08 | End: 2022-12-09 | Stop reason: HOSPADM

## 2022-12-08 RX ORDER — LISINOPRIL 2.5 MG/1
2.5 TABLET ORAL DAILY
COMMUNITY

## 2022-12-08 RX ORDER — ASPIRIN 81 MG/1
81 TABLET, CHEWABLE ORAL DAILY
Status: DISCONTINUED | OUTPATIENT
Start: 2022-12-08 | End: 2022-12-09 | Stop reason: HOSPADM

## 2022-12-08 RX ORDER — SODIUM CHLORIDE 0.9 % (FLUSH) 0.9 %
5-40 SYRINGE (ML) INJECTION EVERY 12 HOURS SCHEDULED
Status: DISCONTINUED | OUTPATIENT
Start: 2022-12-08 | End: 2022-12-09 | Stop reason: HOSPADM

## 2022-12-08 RX ORDER — LANOLIN ALCOHOL/MO/W.PET/CERES
325 CREAM (GRAM) TOPICAL
Status: DISCONTINUED | OUTPATIENT
Start: 2022-12-08 | End: 2022-12-09 | Stop reason: HOSPADM

## 2022-12-08 RX ORDER — INSULIN LISPRO 100 [IU]/ML
0-4 INJECTION, SOLUTION INTRAVENOUS; SUBCUTANEOUS
Status: DISCONTINUED | OUTPATIENT
Start: 2022-12-08 | End: 2022-12-09 | Stop reason: HOSPADM

## 2022-12-08 RX ORDER — ALFUZOSIN HYDROCHLORIDE 10 MG/1
10 TABLET, EXTENDED RELEASE ORAL DAILY
COMMUNITY

## 2022-12-08 RX ORDER — ALBUTEROL SULFATE 90 UG/1
2 AEROSOL, METERED RESPIRATORY (INHALATION) EVERY 4 HOURS PRN
Status: DISCONTINUED | OUTPATIENT
Start: 2022-12-08 | End: 2022-12-09 | Stop reason: HOSPADM

## 2022-12-08 RX ORDER — FERROUS SULFATE 325(65) MG
325 TABLET ORAL
COMMUNITY

## 2022-12-08 RX ORDER — 0.9 % SODIUM CHLORIDE 0.9 %
250 INTRAVENOUS SOLUTION INTRAVENOUS ONCE
Status: COMPLETED | OUTPATIENT
Start: 2022-12-08 | End: 2022-12-08

## 2022-12-08 RX ORDER — ONDANSETRON 2 MG/ML
4 INJECTION INTRAMUSCULAR; INTRAVENOUS EVERY 6 HOURS PRN
Status: DISCONTINUED | OUTPATIENT
Start: 2022-12-08 | End: 2022-12-09 | Stop reason: HOSPADM

## 2022-12-08 RX ORDER — MONTELUKAST SODIUM 10 MG/1
10 TABLET ORAL NIGHTLY
Status: DISCONTINUED | OUTPATIENT
Start: 2022-12-08 | End: 2022-12-09 | Stop reason: HOSPADM

## 2022-12-08 RX ORDER — WARFARIN SODIUM 2 MG/1
4 TABLET ORAL
Status: DISCONTINUED | OUTPATIENT
Start: 2022-12-08 | End: 2022-12-08

## 2022-12-08 RX ORDER — ENOXAPARIN SODIUM 100 MG/ML
40 INJECTION SUBCUTANEOUS DAILY
Status: DISCONTINUED | OUTPATIENT
Start: 2022-12-08 | End: 2022-12-08

## 2022-12-08 RX ORDER — NITROGLYCERIN 0.4 MG/1
0.4 TABLET SUBLINGUAL EVERY 5 MIN PRN
Status: DISCONTINUED | OUTPATIENT
Start: 2022-12-08 | End: 2022-12-09 | Stop reason: HOSPADM

## 2022-12-08 RX ORDER — LISINOPRIL 5 MG/1
5 TABLET ORAL DAILY
Status: DISCONTINUED | OUTPATIENT
Start: 2022-12-08 | End: 2022-12-09 | Stop reason: HOSPADM

## 2022-12-08 RX ORDER — CETIRIZINE HYDROCHLORIDE 10 MG/1
10 TABLET ORAL DAILY
Status: DISCONTINUED | OUTPATIENT
Start: 2022-12-08 | End: 2022-12-09 | Stop reason: HOSPADM

## 2022-12-08 RX ORDER — FUROSEMIDE 40 MG/1
40 TABLET ORAL DAILY
Status: ON HOLD | COMMUNITY
End: 2022-12-09 | Stop reason: SDUPTHER

## 2022-12-08 RX ORDER — FUROSEMIDE 20 MG/1
20 TABLET ORAL DAILY
Status: DISCONTINUED | OUTPATIENT
Start: 2022-12-08 | End: 2022-12-09

## 2022-12-08 RX ORDER — POTASSIUM CHLORIDE 20 MEQ/1
40 TABLET, EXTENDED RELEASE ORAL PRN
Status: DISCONTINUED | OUTPATIENT
Start: 2022-12-08 | End: 2022-12-09 | Stop reason: HOSPADM

## 2022-12-08 RX ORDER — DEXTROSE MONOHYDRATE 100 MG/ML
INJECTION, SOLUTION INTRAVENOUS CONTINUOUS PRN
Status: DISCONTINUED | OUTPATIENT
Start: 2022-12-08 | End: 2022-12-09 | Stop reason: HOSPADM

## 2022-12-08 RX ORDER — PANTOPRAZOLE SODIUM 40 MG/1
40 TABLET, DELAYED RELEASE ORAL
Status: DISCONTINUED | OUTPATIENT
Start: 2022-12-08 | End: 2022-12-09

## 2022-12-08 RX ORDER — INSULIN LISPRO 100 [IU]/ML
0-4 INJECTION, SOLUTION INTRAVENOUS; SUBCUTANEOUS NIGHTLY
Status: DISCONTINUED | OUTPATIENT
Start: 2022-12-08 | End: 2022-12-09 | Stop reason: HOSPADM

## 2022-12-08 RX ORDER — METRONIDAZOLE 7.5 MG/G
GEL TOPICAL 2 TIMES DAILY
Status: ON HOLD | COMMUNITY
End: 2022-12-11 | Stop reason: HOSPADM

## 2022-12-08 RX ORDER — POTASSIUM CHLORIDE 20 MEQ/1
40 TABLET, EXTENDED RELEASE ORAL ONCE
Status: COMPLETED | OUTPATIENT
Start: 2022-12-08 | End: 2022-12-08

## 2022-12-08 RX ORDER — CANAGLIFLOZIN 100 MG/1
100 TABLET, FILM COATED ORAL
COMMUNITY

## 2022-12-08 RX ORDER — ALBUTEROL SULFATE 2.5 MG/3ML
2.5 SOLUTION RESPIRATORY (INHALATION)
Status: DISCONTINUED | OUTPATIENT
Start: 2022-12-08 | End: 2022-12-08

## 2022-12-08 RX ORDER — ATORVASTATIN CALCIUM 40 MG/1
40 TABLET, FILM COATED ORAL DAILY
COMMUNITY

## 2022-12-08 RX ORDER — SODIUM CHLORIDE 9 MG/ML
INJECTION, SOLUTION INTRAVENOUS PRN
Status: DISCONTINUED | OUTPATIENT
Start: 2022-12-08 | End: 2022-12-09 | Stop reason: HOSPADM

## 2022-12-08 RX ORDER — POTASSIUM CHLORIDE 7.45 MG/ML
10 INJECTION INTRAVENOUS PRN
Status: DISCONTINUED | OUTPATIENT
Start: 2022-12-08 | End: 2022-12-09 | Stop reason: HOSPADM

## 2022-12-08 RX ORDER — SODIUM CHLORIDE 0.9 % (FLUSH) 0.9 %
10 SYRINGE (ML) INJECTION PRN
Status: DISCONTINUED | OUTPATIENT
Start: 2022-12-08 | End: 2022-12-09 | Stop reason: HOSPADM

## 2022-12-08 RX ADMIN — CETIRIZINE HYDROCHLORIDE 10 MG: 10 TABLET, FILM COATED ORAL at 10:40

## 2022-12-08 RX ADMIN — AMIODARONE HYDROCHLORIDE 1 MG/MIN: 50 INJECTION, SOLUTION INTRAVENOUS at 00:07

## 2022-12-08 RX ADMIN — APIXABAN 5 MG: 5 TABLET, FILM COATED ORAL at 20:34

## 2022-12-08 RX ADMIN — PERFLUTREN 1.5 ML: 6.52 INJECTION, SUSPENSION INTRAVENOUS at 09:30

## 2022-12-08 RX ADMIN — ATORVASTATIN CALCIUM 40 MG: 40 TABLET, FILM COATED ORAL at 10:40

## 2022-12-08 RX ADMIN — SODIUM CHLORIDE 250 ML: 9 INJECTION, SOLUTION INTRAVENOUS at 05:55

## 2022-12-08 RX ADMIN — MONTELUKAST 10 MG: 10 TABLET, FILM COATED ORAL at 20:33

## 2022-12-08 RX ADMIN — METOPROLOL TARTRATE 25 MG: 25 TABLET, FILM COATED ORAL at 20:34

## 2022-12-08 RX ADMIN — AMIODARONE HYDROCHLORIDE 0.5 MG/MIN: 50 INJECTION, SOLUTION INTRAVENOUS at 08:51

## 2022-12-08 RX ADMIN — PANTOPRAZOLE SODIUM 40 MG: 40 TABLET, DELAYED RELEASE ORAL at 06:21

## 2022-12-08 RX ADMIN — GABAPENTIN 300 MG: 300 CAPSULE ORAL at 20:34

## 2022-12-08 RX ADMIN — SODIUM CHLORIDE, PRESERVATIVE FREE 10 ML: 5 INJECTION INTRAVENOUS at 20:34

## 2022-12-08 RX ADMIN — POTASSIUM CHLORIDE 40 MEQ: 1500 TABLET, EXTENDED RELEASE ORAL at 11:12

## 2022-12-08 RX ADMIN — POTASSIUM CHLORIDE 40 MEQ: 1500 TABLET, EXTENDED RELEASE ORAL at 05:47

## 2022-12-08 RX ADMIN — FERROUS SULFATE TAB EC 325 MG (65 MG FE EQUIVALENT) 325 MG: 325 (65 FE) TABLET DELAYED RESPONSE at 10:40

## 2022-12-08 RX ADMIN — ASPIRIN 81 MG CHEWABLE TABLET 81 MG: 81 TABLET CHEWABLE at 10:40

## 2022-12-08 ASSESSMENT — ENCOUNTER SYMPTOMS
EYES NEGATIVE: 1
GASTROINTESTINAL NEGATIVE: 1
RESPIRATORY NEGATIVE: 1

## 2022-12-08 ASSESSMENT — PAIN SCALES - GENERAL: PAINLEVEL_OUTOF10: 0

## 2022-12-08 NOTE — PLAN OF CARE
Problem: Chronic Conditions and Co-morbidities  Goal: Patient's chronic conditions and co-morbidity symptoms are monitored and maintained or improved  Outcome: Progressing  Flowsheets (Taken 12/8/2022 0200)  Care Plan - Patient's Chronic Conditions and Co-Morbidity Symptoms are Monitored and Maintained or Improved:   Monitor and assess patient's chronic conditions and comorbid symptoms for stability, deterioration, or improvement   Collaborate with multidisciplinary team to address chronic and comorbid conditions and prevent exacerbation or deterioration   Update acute care plan with appropriate goals if chronic or comorbid symptoms are exacerbated and prevent overall improvement and discharge     Problem: Discharge Planning  Goal: Discharge to home or other facility with appropriate resources  Outcome: Progressing  Flowsheets (Taken 12/8/2022 0200)  Discharge to home or other facility with appropriate resources:   Identify barriers to discharge with patient and caregiver   Arrange for needed discharge resources and transportation as appropriate   Identify discharge learning needs (meds, wound care, etc)     Problem: Pain  Goal: Verbalizes/displays adequate comfort level or baseline comfort level  Outcome: Progressing     Problem: ABCDS Injury Assessment  Goal: Absence of physical injury  Outcome: Progressing     Problem: Respiratory - Adult  Goal: Achieves optimal ventilation and oxygenation  Outcome: Progressing     Problem: Cardiovascular - Adult  Goal: Maintains optimal cardiac output and hemodynamic stability  Outcome: Progressing  Goal: Absence of cardiac dysrhythmias or at baseline  Outcome: Progressing     Problem: Metabolic/Fluid and Electrolytes - Adult  Goal: Hemodynamic stability and optimal renal function maintained  Outcome: Progressing  Goal: Glucose maintained within prescribed range  Outcome: Progressing

## 2022-12-08 NOTE — RT PROTOCOL NOTE
RT Inhaler-Nebulizer Bronchodilator Protocol Note    There is a bronchodilator order in the chart from a provider indicating to follow the RT Bronchodilator Protocol and there is an Initiate RT Inhaler-Nebulizer Bronchodilator Protocol order as well (see protocol at bottom of note). CXR Findings:  XR CHEST PORTABLE    Result Date: 12/7/2022  No confluent airspace consolidation. Stable cardiomegaly without evidence of CHF. The findings from the last RT Protocol Assessment were as follows:   History Pulmonary Disease: None or smoker <15 pack years  Respiratory Pattern: Regular pattern and RR 12-20 bpm  Breath Sounds: Clear breath sounds  Cough: Strong, spontaneous, non-productive  Indication for Bronchodilator Therapy: None  Bronchodilator Assessment Score: 0    Aerosolized bronchodilator medication orders have been revised according to the RT Inhaler-Nebulizer Bronchodilator Protocol below. Respiratory Therapist to perform RT Therapy Protocol Assessment initially then follow the protocol. Repeat RT Therapy Protocol Assessment PRN for score 0-3 or on second treatment, BID, and PRN for scores above 3. No Indications - adjust the frequency to every 6 hours PRN wheezing or bronchospasm, if no treatments needed after 48 hours then discontinue using Per Protocol order mode. If indication present, adjust the RT bronchodilator orders based on the Bronchodilator Assessment Score as indicated below. Use Inhaler orders unless patient has one or more of the following: on home nebulizer, not able to hold breath for 10 seconds, is not alert and oriented, cannot activate and use MDI correctly, or respiratory rate 25 breaths per minute or more, then use the equivalent nebulizer order(s) with same Frequency and PRN reasons based on the score. If a patient is on this medication at home then do not decrease Frequency below that used at home.     0-3 - enter or revise RT bronchodilator order(s) to equivalent RT Bronchodilator order with Frequency of every 4 hours PRN for wheezing or increased work of breathing using Per Protocol order mode. 4-6 - enter or revise RT Bronchodilator order(s) to two equivalent RT bronchodilator orders with one order with BID Frequency and one order with Frequency of every 4 hours PRN wheezing or increased work of breathing using Per Protocol order mode. 7-10 - enter or revise RT Bronchodilator order(s) to two equivalent RT bronchodilator orders with one order with TID Frequency and one order with Frequency of every 4 hours PRN wheezing or increased work of breathing using Per Protocol order mode. 11-13 - enter or revise RT Bronchodilator order(s) to one equivalent RT bronchodilator order with QID Frequency and an Albuterol order with Frequency of every 4 hours PRN wheezing or increased work of breathing using Per Protocol order mode. Greater than 13 - enter or revise RT Bronchodilator order(s) to one equivalent RT bronchodilator order with every 4 hours Frequency and an Albuterol order with Frequency of every 2 hours PRN wheezing or increased work of breathing using Per Protocol order mode. RT to enter RT Home Evaluation for COPD & MDI Assessment order using Per Protocol order mode.     Electronically signed by Tyron Muir RCP on 12/8/2022 at 2:55 AM

## 2022-12-08 NOTE — ED PROVIDER NOTES
[address] [place] [address] ED  EMERGENCY DEPARTMENT ENCOUNTER      Pt Name: Pierce Flynn  MRN: [de-identified]  [place] [id number]  Date of evaluation: 12/7/2022  Provider: Herminia Vargas MD    CHIEF COMPLAINT       Chief Complaint   Patient presents with    Pacemaker Problem     Pt states 1 hour ago his pacemaker went off         HISTORY OF PRESENT ILLNESS  (Location/Symptom, Timing/Onset, Context/Setting, Quality, Duration, Modifying Factors, Severity.)   Pierce Flynn is a 47 y.o. male who presents to the emergency department because his defibrillator fired. This happened about an hour before coming into the emergency department. He has had it for 4 years and it is never fired before. He felt his heart was racing before hand. He has not been ill recently, no fever or cough or chest pain or shortness of breath. He feels back to normal now      Nursing Notes were reviewed. ALLERGIES     Patient has no known allergies. CURRENT MEDICATIONS       Previous Medications    ASPIRIN 81 MG TABLET    Take 81 mg by mouth daily    ATORVASTATIN (LIPITOR) 20 MG TABLET    Take 40 mg by mouth daily     CHOLECALCIFEROL (VITAMIN D3) 5000 UNITS TABS    Take by mouth    FLUTICASONE (FLONASE) 50 MCG/ACT NASAL SPRAY    1 spray by Nasal route daily    FUROSEMIDE (LASIX) 20 MG TABLET    Take 20 mg by mouth daily    GABAPENTIN (NEURONTIN) 300 MG CAPSULE    Take 300 mg by mouth 3 times daily.     GLYBURIDE (DIABETA) 5 MG TABLET    Take 5 mg by mouth daily (with breakfast)    INSULIN GLARGINE (BASAGLAR KWIKPEN) 100 UNIT/ML INJECTION PEN    Inject into the skin nightly    KETOTIFEN (ZADITOR) 0.025 % OPHTHALMIC SOLUTION    1 drop 2 times daily    LIRAGLUTIDE (VICTOZA) 18 MG/3ML SOPN SC INJECTION    Inject 1.2 mg into the skin daily    LISINOPRIL (PRINIVIL;ZESTRIL) 10 MG TABLET    Take 5 mg by mouth daily     LORATADINE (CLARITIN) 10 MG TABLET    Take 10 mg by mouth daily    METOPROLOL TARTRATE (LOPRESSOR) 25 MG TABLET    Take 25 mg by mouth 2 times daily    SERTRALINE (ZOLOFT) 50 MG TABLET    Take 50 mg by mouth daily    SITAGLIPTIN (JANUVIA) 100 MG TABLET    Take 100 mg by mouth daily       PAST MEDICAL HISTORY         Diagnosis Date    CAD (coronary artery disease)     Diabetes mellitus ([facility] [address]     Hyperlipidemia     Hypertension     MI (myocardial infarction) ([facility] [address]        SURGICAL HISTORY           Procedure Laterality Date    CHOLECYSTECTOMY           FAMILY HISTORY     History reviewed. No pertinent family history. No family status information on file. SOCIAL HISTORY      reports that he quit smoking about 9 years ago. He has never used smokeless tobacco. He reports that he does not drink alcohol and does not use drugs. REVIEW OF SYSTEMS    (2-9 systems for level 4, 10 or more for level 5)     Review of Systems   Constitutional:  Negative for chills, fatigue and fever. HENT:  Negative for congestion, ear discharge and facial swelling. Eyes:  Negative for discharge and redness. Respiratory:  Negative for cough and shortness of breath. Cardiovascular:  Negative for chest pain. Gastrointestinal:  Negative for abdominal pain, constipation, diarrhea and vomiting. Genitourinary:  Negative for dysuria and hematuria. Musculoskeletal:  Negative for arthralgias. Skin:  Negative for color change and rash. Neurological:  Negative for syncope, numbness and headaches. Hematological:  Negative for adenopathy. Psychiatric/Behavioral:  Negative for confusion. The patient is not nervous/anxious. Except as noted above the remainder of the review of systems was reviewed and negative. PHYSICAL EXAM    (up to 7 for level 4, 8 or more for level 5)     Vitals:    12/07/22 2131 12/07/22 2156 12/07/22 2200   BP: 109/76     Pulse: 96 96    Resp: 17 13    Temp:   98.2 °F (36.8 °C)   SpO2:  96%    Weight:  200 lb (90.7 kg)    Height:  5' 6\" (1.676 m)        Physical Exam  Vitals reviewed.    Constitutional:       General: He is not in acute distress. Appearance: He is well-developed. He is not diaphoretic. HENT:      Head: Normocephalic and atraumatic. Eyes:      General: No scleral icterus. Right eye: No discharge. Left eye: No discharge. Cardiovascular:      Rate and Rhythm: Normal rate and regular rhythm. Pulmonary:      Effort: Pulmonary effort is normal. No respiratory distress. Breath sounds: Normal breath sounds. No stridor. No wheezing or rales. Abdominal:      General: There is no distension. Palpations: Abdomen is soft. Tenderness: There is no abdominal tenderness. Musculoskeletal:         General: Normal range of motion. Cervical back: Neck supple. Lymphadenopathy:      Cervical: No cervical adenopathy. Skin:     General: Skin is warm and dry. Findings: No erythema or rash. Neurological:      Mental Status: He is alert and oriented to person, place, and time. Psychiatric:         Behavior: Behavior normal.           DIAGNOSTIC RESULTS     EKG: All EKG's are interpreted by the Emergency Department Physician who either signs or Co-signs this chart in the absence of a cardiologist.    EKG on my interpretation shows no acute finding    RADIOLOGY:   Non-plain film images such as CT, Ultrasound and MRI are read by the radiologist. Plain radiographic images are visualized and preliminarily interpreted by the emergency physician with the below findings:    Interpretation per the Radiologist below, if available at the time of this note:    XR CHEST PORTABLE    Result Date: 12/7/2022  EXAMINATION: [address] 12/7/2022 7:31 pm COMPARISON: 09/15/2019. HISTORY: ORDERING SYSTEM PROVIDED HISTORY: Chest Pain TECHNOLOGIST PROVIDED HISTORY: Chest Pain Reason for Exam: Pacemaker problem, chest pain today FINDINGS: A single lead pacemaker is unchanged. There is no confluent consolidation or effusion. Mild cardiomegaly appears stable.   There is no vascular congestion or interstitial prominence. No confluent airspace consolidation. Stable cardiomegaly without evidence of CHF. LABS:  Labs Reviewed   CBC WITH AUTO DIFFERENTIAL - Abnormal; Notable for the following components:       Result Value    RBC 6.22 (*)     Hemoglobin 12.6 (*)     MCV 71.9 (*)     MCH 20.3 (*)     MCHC 28.2 (*)     RDW 19.8 (*)     Monocytes 8 (*)     All other components within normal limits   BASIC METABOLIC PANEL - Abnormal; Notable for the following components:    Glucose 196 (*)     Calcium 8.5 (*)     Potassium 3.6 (*)     All other components within normal limits   TROP/MYOGLOBIN - Abnormal; Notable for the following components:    Myoglobin 101 (*)     All other components within normal limits   TROP/MYOGLOBIN   TROP/MYOGLOBIN       All other labs were within normal range or not returned as of this dictation. EMERGENCY DEPARTMENT COURSE and DIFFERENTIAL DIAGNOSIS/MDM:   Vitals:    Vitals:    12/07/22 2131 12/07/22 2156 12/07/22 2200   BP: 109/76     Pulse: 96 96    Resp: 17 13    Temp:   98.2 °F (36.8 °C)   SpO2:  96%    Weight:  200 lb (90.7 kg)    Height:  5' 6\" (1.676 m)        No orders of the defined types were placed in this encounter. Medical Decision Making: Pacemaker interrogation was performed and the tech reports that he has had about 30 episodes of V. tach in the last 24 hours. He did have discharge of his defibrillator. Case discussed with cardiology and the patient was started on amiodarone and is being admitted. CRITICAL CARE TIME     Due to the high probability of sudden and clinically significant deterioration in the patient's condition he required highest level of my preparedness to intervene urgently. I provided critical care time including documentation time, medication orders and management, reevaluation, vital sign assessment, ordering and reviewing of of lab tests ordering and reviewing of x-ray studies, and admission orders.  Aggregate critical care time is 28 minutes including only time during which I was engaged in work directly related to his care and did not include time spent treating other patients simultaneously. CONSULTS:  IP CONSULT TO CARDIOLOGY    PROCEDURES:  None    FINAL IMPRESSION    No diagnosis found. DISPOSITION/PLAN   DISPOSITION        PATIENT REFERRED TO:   No follow-up provider specified. DISCHARGE MEDICATIONS:     New Prescriptions    No medications on file       The care is provided during an unprecedented national emergency due to the novel coronavirus, COVID-19.     (Please note that portions of this note were completed with a voice recognition program.  Efforts were made to edit the dictations but occasionally words are mis-transcribed.)    Ivan Murrell MD  Attending Emergency Physician            Ivan Murrell MD  12/07/22 5724

## 2022-12-08 NOTE — PROGRESS NOTES
Patient states he does not speak english (Spouse present). Writer provided a silent prayer of continued healing, comfort, and rest.    Spiritual care will follow up as needed or requested.      12/08/22 1231   Encounter Summary   Service Provided For: Patient   Referral/Consult From: [facility]   Support System Spouse   Last Encounter  12/08/22  (12/8/2022 Does Not Speak English)   Complexity of Encounter Low   Begin Time 1200   End Time  1205   Total Time Calculated 5 min   Encounter    Type Initial Screen/Assessment   Assessment/Intervention/Outcome   Assessment Unable to assess  (Does not speak english)   Intervention Prayer (assurance of)/Bryan   Plan and Referrals   Plan/Referrals No future visits requested

## 2022-12-08 NOTE — PROGRESS NOTES
Pt admitted to the unit at 2:00 am. Vitals stable, admission assessment completed,  utilized. Pt oriented to room, all questions answered, all concerns addressed.

## 2022-12-08 NOTE — CARE COORDINATION
Francesco Walker would like pt to be started on Eliquis 5mg po bid #[phone] refills-called into [facility] for cost.

## 2022-12-08 NOTE — CARE COORDINATION
DC Planning    Spoke with [facility] pt cost for Eliquis is $2 per month. They will have first month ready in rx-and will use 30 day free trial card.

## 2022-12-08 NOTE — CONSULTS
Warfarin Dosing - Pharmacy Consult Note  Consulting Provider: PRATIMA Butler CNP  Indication:  v tach/ICD  Warfarin Dose prior to admission: 4mg daily    Concurrent anticoagulants/antiplatelets: none  Significant Drug Interactions: amiodarone (incr INR), sertraline   Recent Labs     12/07/22  2225 12/08/22  0455   INR  --  1.9   HGB 12.6* 12.3*    287   LABALBU  --  3.7     Recent warfarin administrations        No warfarin orders with administrations found. Orders not given:            warfarin placeholder: dosing by pharmacy                   Date   INR    Dose  12/8       1.9     Assessment/Plan  (Goal INR: 2 - 3)  Coumadin 4mg today. Inr in am.     Active problem list reviewed. INR orders are placed. Chart reviewed for pertinent labs, drug/diet interactions, and past doses. Documentation of patient's clinical condition was reviewed. Pharmacy Dosing:  Pharmacy will continue to follow.

## 2022-12-08 NOTE — FLOWSHEET NOTE
12/08/22 0815   Treatment Team Notification   Reason for Communication Evaluate   Team Member Name Dr Terrence Miguel Team Role Consulting Provider   Method of Communication Call   Response See orders     MD called unit, updated on current status by RN. Per vijaya Soto to change amio infusion to 0.5mg/min. Replace potassium per replacement scale. Restart coumadin, pharmacy may dose. Will see pt this afternoon.

## 2022-12-08 NOTE — FLOWSHEET NOTE
12/08/22 1430   Treatment Team Notification   Reason for Communication Review case   Team Member Name  Sultananoemi David Team Role Consulting Provider   Method of Communication Call   Response See orders     MD called unit to notify RN that echocardiogram showed pt's existing thrombus is still present. RN to verify that pt's insurance will cover eliquis as current coumadin regimen does not appear to be effective. If eliquis can be prescribed per insurance, start pt on 5mg po BID and stop coumadin. If eliquis cannot be prescribed, pt will need to start on lovenox to reach INR of 2.5.  RN to update MD with any issues encountered.

## 2022-12-08 NOTE — H&P
[facility]  Office: [address], DO, Rosalie Frankel, DO, Ellyn Lowry, DO, Radha Walker Blood, DO, Casey Garcia MD, Ana Cristina Rocha MD, Omid Giles MD, Indu Hermosillo MD,  Gurinder Etienne MD, Karson Bazzi MD, Angela Isabel, DO, Pino Castelan MD,  Marciano Foster MD, Laz Monterroso MD, Annita Huitron, DO, Naida Benavides MD, Cristina Goodwin MD, Diane Ocasio, DO, Bernabe Yap MD, Raulito Lara MD, Arjun Fallon MD, Law Cazares MD, Andres Araujo DO, Guera Abdullahi MD, Santosh Maravilla MD, Lisa Velásquez [facility],  Gopal Cornejo, CNP, Craig Sever, CNP, Cody Gregg, CNP,  Lydia Dickens, [facility], Meghan Woodward, CNP, Amaya Graham, CNP, Sanford Hernandez, CNP, Rubin Garduno, CNP, Pedro Simmons, CNP, Chastity Jordan, PA-C, Antoinette Crain, CNS, Kaye Soliman, CNP, Gloria Alegre, CNP         [facility]    HISTORY AND PHYSICAL EXAMINATION            Date:   12/8/2022  Patient name:  Geovanna Ross  Date of admission:  12/7/2022  9:29 PM  MRN:   [id number]  Account:  [de-identified]  YOB: 1970  PCP:    Sylvester Durant MD  Room:   2031/2031-01  Code Status:    Full Code    Chief Complaint:     Chief Complaint   Patient presents with    Pacemaker Problem     Pt states 1 hour ago his pacemaker went off       History Obtained From:     patient (approved  used via video call)    History of Present Illness: Geovanna Ross is a 47 y.o. Non- / non  male who presents with Pacemaker Problem (Pt states 1 hour ago his pacemaker went off)   and is admitted to the hospital for the management of Recurrent ventricular tachycardia. Patient has a history of ICD in place due to generalized ischemic myocardial dysfunction. He received ICD 4 years ago. Prior to yesterday, the device had never discharged.   Patient says he was resting/relaxing when he felt his heart racing and then a subsequent shock. Day before yesterday, he says that he felt his heart racing for about 30 minutes, but he did not receive a shock at that time. He has a history of diabetic neuropathy, diabetes type 2, hyperlipidemia, anemia, chronic anticoagulation with warfarin due to history of clots, hypertension. He denies feeling ill. He says the last time he saw his cardiologist at [facility] was approximately 4 months ago. He denies any recent changes in his medications. He denies chest pain or shortness of breath. He came to the ED for evaluation after the AICD fired. While in ED, chest x-ray revealed mild cardiomegaly and no consolidation or CHF. Hemoglobin was 12.6, blood glucose 196, potassium 3.6. The AICD was interrogated while in the ED and it was found that the patient had approximately 30 episodes of V. tach in the last 24 hours, and it was verified that his device did indeed discharge. ED provider spoke with cardiology and an amiodarone infusion was started. He is being admitted for further management and work-up of recurrent ventricular tachycardia. Past Medical History:     Past Medical History:   Diagnosis Date    CAD (coronary artery disease)     Diabetes mellitus ([facility]     Hyperlipidemia     Hypertension     MI (myocardial infarction) ([facility] [facility]         Past Surgical History:     Past Surgical History:   Procedure Laterality Date    CHOLECYSTECTOMY      MULTIPLE TOOTH EXTRACTIONS          Medications Prior to Admission:     Prior to Admission medications    Medication Sig Start Date End Date Taking?  Authorizing Provider   alfuzosin (UROXATRAL) 10 MG extended release tablet Take 10 mg by mouth daily   Yes Historical Provider, MD   montelukast (SINGULAIR) 10 MG tablet Take 10 mg by mouth nightly   Yes Historical Provider, MD   ferrous sulfate (IRON 325) 325 (65 Fe) MG tablet Take 325 mg by mouth daily (with breakfast)   Yes Historical Provider, MD   furosemide (LASIX) 40 MG tablet Take 40 mg by mouth daily   Yes Historical Provider, MD   omeprazole (PRILOSEC) 20 MG delayed release capsule Take 20 mg by mouth daily   Yes Historical Provider, MD   atorvastatin (LIPITOR) 40 MG tablet Take 40 mg by mouth daily   Yes Historical Provider, MD   warfarin (COUMADIN) 4 MG tablet Take 4 mg by mouth daily Take one to one and 1/2 tablet by mouth every day in the evening as directed for blood thinning   Yes Historical Provider, MD   lisinopril (PRINIVIL;ZESTRIL) 2.5 MG tablet Take 2.5 mg by mouth daily   Yes Historical Provider, MD   metoprolol tartrate (LOPRESSOR) 25 MG tablet Take 25 mg by mouth 2 times daily    Historical Provider, MD   gabapentin (NEURONTIN) 300 MG capsule Take 300 mg by mouth 3 times daily. Historical Provider, MD   SITagliptin (JANUVIA) 100 MG tablet Take 100 mg by mouth daily    Historical Provider, MD   sertraline (ZOLOFT) 50 MG tablet Take 50 mg by mouth daily    Historical Provider, MD   insulin glargine (BASAGLAR KWIKPEN) 100 UNIT/ML injection pen Inject into the skin nightly    Historical Provider, MD   aspirin 81 MG tablet Take 81 mg by mouth daily    Historical Provider, MD   Cholecalciferol (VITAMIN D3) 5000 units TABS Take by mouth    Historical Provider, MD   Liraglutide (VICTOZA) 18 MG/3ML SOPN SC injection Inject 1.2 mg into the skin daily    Historical Provider, MD   loratadine (CLARITIN) 10 MG tablet Take 10 mg by mouth daily    Historical Provider, MD   ketotifen (ZADITOR) 0.025 % ophthalmic solution 1 drop 2 times daily    Historical Provider, MD   fluticasone (FLONASE) 50 MCG/ACT nasal spray 1 spray by Nasal route daily    Historical Provider, MD   glyBURIDE (DIABETA) 5 MG tablet Take 5 mg by mouth 2 times daily    Historical Provider, MD        Allergies:     Dapagliflozin    Social History:     Tobacco:    reports that he has been smoking. He has never used smokeless tobacco.  Alcohol:      reports no history of alcohol use.   Drug Use:  reports no history of drug use.    Family History:     Family History   Problem Relation Age of Onset    Heart Attack Mother     Heart Attack Father        Review of Systems:     Positive and Negative as described in HPI. Review of Systems   Constitutional: Negative. HENT: Negative. Eyes: Negative. Respiratory: Negative. Cardiovascular:  Positive for palpitations. Negative for chest pain and leg swelling. Gastrointestinal: Negative. Genitourinary: Negative. Musculoskeletal: Negative. Skin: Negative. Neurological: Negative. Psychiatric/Behavioral: Negative. Physical Exam:   /65   Pulse 90   Temp 97.7 °F (36.5 °C) (Temporal)   Resp 23   Ht 5' 6\" (1.676 m)   Wt 200 lb (90.7 kg)   SpO2 95%   BMI 32.28 kg/m²   Temp (24hrs), Av.7 °F (36.5 °C), Min:97.3 °F (36.3 °C), Max:98.2 °F (36.8 °C)    No results for input(s): POCGLU in the last 72 hours. No intake or output data in the 24 hours ending 22 0504    Physical Exam  Vitals and nursing note reviewed. Constitutional:       General: He is not in acute distress. Appearance: He is not ill-appearing, toxic-appearing or diaphoretic. HENT:      Head: Normocephalic and atraumatic. Right Ear: External ear normal.      Left Ear: External ear normal.      Nose: Nose normal. No rhinorrhea. Mouth/Throat:      Mouth: Mucous membranes are moist.   Eyes:      General: No scleral icterus. Right eye: No discharge. Left eye: No discharge. Extraocular Movements: Extraocular movements intact. Conjunctiva/sclera: Conjunctivae normal.      Pupils: Pupils are equal, round, and reactive to light. Cardiovascular:      Rate and Rhythm: Normal rate and regular rhythm. Pulses: Normal pulses. Heart sounds: Normal heart sounds. No murmur heard. No friction rub. No gallop. Pulmonary:      Effort: Pulmonary effort is normal. No respiratory distress. Breath sounds: Normal breath sounds.  No wheezing, rhonchi or rales. Abdominal:      General: There is no distension. Palpations: Abdomen is soft. Tenderness: There is no abdominal tenderness. There is no guarding. Hernia: No hernia is present. Comments: Hypoactive bowel sounds   Musculoskeletal:         General: Normal range of motion. Cervical back: Normal range of motion and neck supple. Right lower leg: No edema. Left lower leg: No edema. Skin:     General: Skin is warm. Coloration: Skin is not jaundiced. Findings: No bruising, erythema or lesion. Neurological:      General: No focal deficit present. Mental Status: He is alert and oriented to person, place, and time. Psychiatric:         Mood and Affect: Mood normal.         Behavior: Behavior normal.         Thought Content:  Thought content normal.         Judgment: Judgment normal.       Investigations:      Laboratory Testing:  Recent Results (from the past 24 hour(s))   CBC with Auto Differential    Collection Time: 12/07/22 10:25 PM   Result Value Ref Range    WBC 7.6 3.5 - 11.3 k/uL    RBC 6.22 (H) 4.21 - 5.77 m/uL    Hemoglobin 12.6 (L) 13.0 - 17.0 g/dL    Hematocrit 44.7 40.7 - 50.3 %    MCV 71.9 (L) 82.6 - 102.9 fL    MCH 20.3 (L) 25.2 - 33.5 pg    MCHC 28.2 (L) 28.4 - 34.8 g/dL    RDW 19.8 (H) 11.8 - 14.4 %    Platelets 353 663 - 308 k/uL    MPV 10.7 8.1 - 13.5 fL    NRBC Automated 0.0 0.0 per 100 WBC    Seg Neutrophils 62 36 - 66 %    Lymphocytes 27 24 - 44 %    Monocytes 8 (H) 1 - 7 %    Eosinophils % 2 1 - 4 %    Basophils 1 %    Immature Granulocytes 0 0 %    Segs Absolute 4.71 1.8 - 7.7 k/uL    Absolute Lymph # 2.05 1.0 - 4.8 k/uL    Absolute Mono # 0.61 0.2 - 0.8 k/uL    Absolute Eos # 0.15 0.0 - 0.4 k/uL    Basophils Absolute 0.08 0.0 - 0.2 k/uL    Absolute Immature Granulocyte 0.00 0.00 - 0.30 k/uL    Morphology HYPOCHROMIA PRESENT     Morphology MICROCYTOSIS PRESENT     Morphology ANISOCYTOSIS PRESENT     Morphology LARGE PLATELETS PRESENT    Basic Metabolic Panel    Collection Time: 12/07/22 10:25 PM   Result Value Ref Range    Glucose 196 (H) 70 - 99 mg/dL    BUN 12 6 - 20 mg/dL    Creatinine 0.79 0.70 - 1.20 mg/dL    Est, Glom Filt Rate >60 >60 mL/min/1.73m2    Bun/Cre Ratio 15 9 - 20    Calcium 8.5 (L) 8.6 - 10.4 mg/dL    Sodium 136 135 - 144 mmol/L    Potassium 3.6 (L) 3.7 - 5.3 mmol/L    Chloride 101 98 - 107 mmol/L    CO2 24 20 - 31 mmol/L    Anion Gap 11 9 - 17 mmol/L   TROP/MYOGLOBIN    Collection Time: 12/07/22 10:25 PM   Result Value Ref Range    Troponin, High Sensitivity 16 0 - 22 ng/L    Myoglobin 101 (H) 28 - 72 ng/mL   TROP/MYOGLOBIN    Collection Time: 12/08/22 12:13 AM   Result Value Ref Range    Troponin, High Sensitivity 19 0 - 22 ng/L    Myoglobin 67 28 - 72 ng/mL   TROP/MYOGLOBIN    Collection Time: 12/08/22  2:10 AM   Result Value Ref Range    Troponin, High Sensitivity 23 (H) 0 - 22 ng/L    Myoglobin 73 (H) 28 - 72 ng/mL       Imaging/Diagnostics:  XR CHEST PORTABLE    Result Date: 12/7/2022  No confluent airspace consolidation. Stable cardiomegaly without evidence of CHF. Assessment :      Hospital Problems             Last Modified POA    * (Principal) Recurrent ventricular tachycardia 12/8/2022 Yes    AICD discharge 12/8/2022 Yes    Essential hypertension 12/8/2022 Yes    DM (diabetes mellitus), type 2 ([address] 12/8/2022 Yes    Chronic anticoagulation 12/8/2022 Yes    Current every day nicotine vapor product user 12/8/2022 Yes    Overview Signed 12/8/2022  4:47 AM by DWIGHT Amor NP     Patient smokes hookah         Hyperlipidemia 12/8/2022 Yes    Obesity with body mass index 30 or greater 12/8/2022 Yes       Plan:     Patient status inpatient in the  Cardiovascular ICU    Recurrent ventricular tachycardia: Amiodarone drip 1 mg/min. Cardiology consult. Telemetry. Potassium 3.6 while in ED, 40 mEq p.o. KCl x1 now-obtain 2D echo. AICD discharge: AICD interrogated and VT and device firing verified. Cardiology consulted. Hypertension: Hold Lasix for now due to BP soft. Parameters placed on BP meds. Monitor vital signs closely. Type 2 diabetes: Carb controlled diet. Check A1c. Sliding scale insulin. Check blood glucose before meals and at bedtime  Chronic anticoagulation: Consult pharmacy for warfarin dosing. Daily INR for INR goal 2-3. Hyperlipidemia: Fasting lipids this a.m. Obesity: Weight loss management as outpatient per PCP  Daily nicotine use: Patient encouraged to abstain from hookah with nicotine    Consultations:   [address] TO HOSPITALIST  PHARMACY TO DOSE WARFARIN     Patient is admitted as inpatient status because of co-morbidities listed above, severity of signs and symptoms as outlined, requirement for current medical therapies and most importantly because of direct risk to patient if care not provided in a hospital setting. Expected length of stay > 48 hours.     36 minutes spent on the completion of this H&P    DWIGHT Peng NP  12/8/2022  5:04 AM    Copy sent to Dr. Fadi Lazaro MD

## 2022-12-08 NOTE — PLAN OF CARE
Problem: Chronic Conditions and Co-morbidities  Goal: Patient's chronic conditions and co-morbidity symptoms are monitored and maintained or improved  Outcome: Progressing     Problem: Discharge Planning  Goal: Discharge to home or other facility with appropriate resources  Outcome: Progressing     Problem: Pain  Goal: Verbalizes/displays adequate comfort level or baseline comfort level  Outcome: Progressing     Problem: ABCDS Injury Assessment  Goal: Absence of physical injury  Outcome: Progressing     Problem: Respiratory - Adult  Goal: Achieves optimal ventilation and oxygenation  Outcome: Progressing     Problem: Cardiovascular - Adult  Goal: Maintains optimal cardiac output and hemodynamic stability  Outcome: Progressing  Goal: Absence of cardiac dysrhythmias or at baseline  Outcome: Progressing     Problem: Metabolic/Fluid and Electrolytes - Adult  Goal: Hemodynamic stability and optimal renal function maintained  Outcome: Progressing  Goal: Glucose maintained within prescribed range  Outcome: Progressing

## 2022-12-08 NOTE — CONSULTS
Section of Cardiology   Consult Note      Reason for Consult:  ICD shock  Requesting Physician: Mark Patterson MD    CHIEF COMPLAINT: Same    History Obtained From:  patient, electronic medical record, Patient's nurse    HISTORY OF PRESENT ILLNESS:      The patient is a 47 y.o. male with significant past medical history of Nonischemic cardiomyopathy, ICD who presents with ICD firing, interrogation of the ICD in the emergency room showed that the patient had numerous ventricular tachycardia  attacks with one shock. The patient tells me that it is his first time with that. The patient follows with [facility] and known to have left ventricular thrombus and on chronic Coumadin. He denies any bleeding. The patient does not report significant shortness breath. No PND orthopnea. No recent falls. No syncope. Denies any PND or orthopnea. No fever or chills or cough. No recent change in his medications. Previous diagnostic testing for coronary artery disease includes: cardiac catheterization, echocardiogram, EPS study, persantine thallium. Previous history of cardiac disease includes: cardiomyopathy and CHF. Coronary artery disease risk factors include: hypertension, male gender, obesity (BMI >= 30 kg/m2), and sedentary lifestyle. Past Medical History:    Past Medical History:   Diagnosis Date    CAD (coronary artery disease)     Diabetes mellitus ([facility] [address]     Hyperlipidemia     Hypertension     MI (myocardial infarction) ([facility] [address]      Past Surgical History:    Past Surgical History:   Procedure Laterality Date    CHOLECYSTECTOMY      MULTIPLE TOOTH EXTRACTIONS       Home Medications:  Prior to Admission medications    Medication Sig Start Date End Date Taking?  Authorizing Provider   alfuzosin (UROXATRAL) 10 MG extended release tablet Take 10 mg by mouth daily   Yes Historical Provider, MD   montelukast (SINGULAIR) 10 MG tablet Take 10 mg by mouth nightly   Yes Historical Provider, MD ferrous sulfate (IRON 325) 325 (65 Fe) MG tablet Take 325 mg by mouth daily (with breakfast)   Yes Historical Provider, MD   furosemide (LASIX) 40 MG tablet Take 40 mg by mouth daily   Yes Historical Provider, MD   omeprazole (PRILOSEC) 20 MG delayed release capsule Take 20 mg by mouth daily   Yes Historical Provider, MD   atorvastatin (LIPITOR) 40 MG tablet Take 40 mg by mouth daily   Yes Historical Provider, MD   warfarin (COUMADIN) 4 MG tablet Take 4 mg by mouth daily Take one to one and 1/2 tablet by mouth every day in the evening as directed for blood thinning   Yes Historical Provider, MD   lisinopril (PRINIVIL;ZESTRIL) 2.5 MG tablet Take 2.5 mg by mouth daily   Yes Historical Provider, MD   metoprolol tartrate (LOPRESSOR) 25 MG tablet Take 25 mg by mouth 2 times daily    Historical Provider, MD   gabapentin (NEURONTIN) 300 MG capsule Take 300 mg by mouth 3 times daily.     Historical Provider, MD   SITagliptin (JANUVIA) 100 MG tablet Take 100 mg by mouth daily    Historical Provider, MD   sertraline (ZOLOFT) 50 MG tablet Take 50 mg by mouth daily    Historical Provider, MD   insulin glargine (BASAGLAR KWIKPEN) 100 UNIT/ML injection pen Inject into the skin nightly    Historical Provider, MD   aspirin 81 MG tablet Take 81 mg by mouth daily    Historical Provider, MD   Cholecalciferol (VITAMIN D3) 5000 units TABS Take by mouth    Historical Provider, MD   Liraglutide (VICTOZA) 18 MG/3ML SOPN SC injection Inject 1.2 mg into the skin daily    Historical Provider, MD   loratadine (CLARITIN) 10 MG tablet Take 10 mg by mouth daily    Historical Provider, MD   ketotifen (ZADITOR) 0.025 % ophthalmic solution 1 drop 2 times daily    Historical Provider, MD   fluticasone (FLONASE) 50 MCG/ACT nasal spray 1 spray by Nasal route daily    Historical Provider, MD   glyBURIDE (DIABETA) 5 MG tablet Take 5 mg by mouth 2 times daily    Historical Provider, MD     Current Medications:    Current Facility-Administered Medications Medication Dose Route Frequency Provider Last Rate Last Admin    aspirin chewable tablet 81 mg  81 mg Oral Daily Lacretia Plater, APRN - CNP        atorvastatin (LIPITOR) tablet 40 mg  40 mg Oral Daily Lacretia Plater, APRN - CNP        furosemide (LASIX) tablet 20 mg  20 mg Oral Daily Lacretia Plater, APRN - CNP        fluticasone (FLONASE) 50 MCG/ACT nasal spray 1 spray  1 spray Nasal Daily Lacretia Plater, APRN - CNP        gabapentin (NEURONTIN) capsule 300 mg  300 mg Oral TID Lacretia Plater, APRN - CNP        lisinopril (PRINIVIL;ZESTRIL) tablet 5 mg  5 mg Oral Daily Yolis Merle, APRN - NP        cetirizine (ZYRTEC) tablet 10 mg  10 mg Oral Daily Lacretia Plater, APRN - CNP        metoprolol tartrate (LOPRESSOR) tablet 25 mg  25 mg Oral BID Yolis Merle, APRN - NP        sertraline (ZOLOFT) tablet 50 mg  50 mg Oral Daily Lacretia Plater, APRN - CNP        glucose chewable tablet 16 g  4 tablet Oral PRN Lacretia Plater, APRN - CNP        dextrose bolus 10% 125 mL  125 mL IntraVENous PRN Lacretia Plater, APRN - CNP        Or    dextrose bolus 10% 250 mL  250 mL IntraVENous PRN Lacretia Plater, APRN - CNP        glucagon (rDNA) injection 1 mg  1 mg SubCUTAneous PRN Lacretia Plater, APRN - CNP        dextrose 10 % infusion   IntraVENous Continuous PRN Lacretia Plater, APRN - CNP        sodium chloride flush 0.9 % injection 5-40 mL  5-40 mL IntraVENous 2 times per day Lacretia Plater, APRN - CNP        sodium chloride flush 0.9 % injection 10 mL  10 mL IntraVENous PRN Lacretia Plater, APRN - CNP        0.9 % sodium chloride infusion   IntraVENous PRN Lacretia Plater, APRN - CNP        potassium chloride (KLOR-CON M) extended release tablet 40 mEq  40 mEq Oral PRN Lacretia Plater, APRN - CNP        Or    potassium bicarb-citric acid (EFFER-K) effervescent tablet 40 mEq  40 mEq Oral PRN Carol Lyell Alferd OrtDWIGHT CNP        Or    potassium chloride 10 mEq/100 mL IVPB (Peripheral Line)  10 mEq IntraVENous PRN DWIGHT Casper CNP        magnesium sulfate 1000 mg in dextrose 5% 100 mL IVPB  1,000 mg IntraVENous PRN DWIGHT Casper CNP        ondansetron (ZOFRAN-ODT) disintegrating tablet 4 mg  4 mg Oral Q8H PRN DWIGHT Casper CNP        Or    ondansetron [facility]) injection 4 mg  4 mg IntraVENous Q6H PRN DWIGHT Casper CNP        acetaminophen (TYLENOL) tablet 650 mg  650 mg Oral Q6H PRN DWIGHT Casper CNP        Or    acetaminophen (TYLENOL) suppository 650 mg  650 mg Rectal Q6H PRN DWIGHT Casper CNP        magnesium hydroxide (MILK OF MAGNESIA) 400 MG/5ML suspension 30 mL  30 mL Oral Daily PRN DWIGHT Casper CNP        nitroGLYCERIN (NITROSTAT) SL tablet 0.4 mg  0.4 mg SubLINGual Q5 Min PRN DWIGHT Casper CNP        insulin lispro (HUMALOG) injection vial 0-4 Units  0-4 Units SubCUTAneous TID WC DWIGHT Casper CNP        insulin lispro (HUMALOG) injection vial 0-4 Units  0-4 Units SubCUTAneous Nightly DWIGHT Casper CNP        albuterol sulfate HFA (PROVENTIL;VENTOLIN;PROAIR) 108 (90 Base) MCG/ACT inhaler 2 puff  2 puff Inhalation Q4H PRN DWIGHT Casper CNP        pantoprazole (PROTONIX) tablet 40 mg  40 mg Oral QAM AC Kathyrn Moulding, APRN - NP   40 mg at 12/08/22 0887    ferrous sulfate (FE TABS 325) EC tablet 325 mg  325 mg Oral Daily with breakfast Kathyrn Moulding, DWIGHT - MACKENZIE        montelukast (SINGULAIR) tablet 10 mg  10 mg Oral Nightly Kathyrn Moulding, DWIGHT Nielsen NP        perflutren lipid microspheres (DEFINITY) injection 1.5 mL  1.5 mL IntraVENous ONCE PRN Jason Moulding, DWIGHT Nielsen NP        amiodarone (CORDARONE) 450 mg in dextrose 5 % 250 mL infusion  1 mg/min IntraVENous Continuous DWIGHT Caspre CNP 33.3 mL/hr at 12/08/22 0224 1 mg/min at 12/08/22 0224     Allergies:  Dapagliflozin    Social History:    Social History     Socioeconomic History    Marital status:      Spouse name: Not on file    Number of children: Not on file    Years of education: Not on file    Highest education level: Not on file   Occupational History    Not on file   Tobacco Use    Smoking status: Every Day    Smokeless tobacco: Never    Tobacco comments:     Patient smokes a Hookah at least daily   Vaping Use    Vaping Use: Never used   Substance and Sexual Activity    Alcohol use: No    Drug use: No    Sexual activity: Not on file   Other Topics Concern    Not on file   Social History Narrative    Not on file     Social Determinants of Health     Financial Resource Strain: Not on file   Food Insecurity: Not on file   Transportation Needs: Not on file   Physical Activity: Not on file   Stress: Not on file   Social Connections: Not on file   Intimate Partner Violence: Not on file   Housing Stability: Not on file     Family History:   Family History   Problem Relation Age of Onset    Heart Attack Mother     Heart Attack Father        REVIEW OF SYSTEMS   The patient denies any headache. No blurred vision. No back pain. Denies any change in weight. No nausea or vomiting or abdominal pain.     PHYSICAL EXAM:    Vitals:    VITALS:  BP 98/65   Pulse 78   Temp 97.7 °F (36.5 °C)   Resp 14   Ht 5' 6\" (1.676 m)   Wt 200 lb (90.7 kg)   SpO2 92%   BMI 32.28 kg/m²   24HR INTAKE/OUTPUT:  No intake or output data in the 24 hours ending 12/08/22 0816    CONSTITUTIONAL:  awake, alert, cooperative, no apparent distress, and appears stated age  EYES: Pupils equal, round and reactive to light, extra ocular muscles intact, sclera clear, conjunctiva normal  ENT:  normocepalic, without obvious abnormality  NECK:  supple, symmetrical, trachea midline, no carotid bruit ,   No  JVD  BACK:  symmetric  LUNGS: Non-labored, good air exchange, clear to auscultation bilaterally, no crackles or wheezing  CARDIOVASCULAR:  Normal apical impulse, regular rate and rhythm, normal S1 and S2, no S3 or S4, and no murmur noted, no rub.  radial and bilateralpresent 2+  ABDOMEN:  No scars, normal bowel sounds, soft, non-distended, non-tender, no masses palpated, no hepatosplenomegally, no bruit. MUSCULOSKELETAL:  there is no redness, warmth, or swelling of the joints   No legs edema. NEUROLOGIC:  Awake, alert, oriented to name, place and time.   SKIN:  no bruising or bleeding, normal skin color, texture, turgor and no jaundice    DATA:   ECG:  Normal sinus rhythm, APCs, nonspecific ST-T changes  ECHO: Date:   Patient had echo [facility] all showed presence of LV thrombus with ejection fraction at 25%  Stress Test:  Most recent stress test was negative for ischemia  Angiography:  Not performed to date    Cardiology Labs:  Recent Labs     12/07/22  2225 12/08/22  0013 12/08/22  0210   MYOGLOBIN 101* 67 73*     Warfarin PT/INR:  Lab Results   Component Value Date/Time    PROTIME 21.4 12/08/2022 04:55 AM    INR 1.9 12/08/2022 04:55 AM     CBC:  Lab Results   Component Value Date/Time    WBC 6.8 12/08/2022 04:55 AM    RBC 6.10 12/08/2022 04:55 AM    HGB 12.3 12/08/2022 04:55 AM    HCT 44.3 12/08/2022 04:55 AM    MCV 72.6 12/08/2022 04:55 AM    MCH 20.2 12/08/2022 04:55 AM    MCHC 27.8 12/08/2022 04:55 AM    RDW 19.9 12/08/2022 04:55 AM     12/08/2022 04:55 AM    MPV 10.5 12/08/2022 04:55 AM     CMP:  Lab Results   Component Value Date/Time     12/08/2022 04:55 AM    K 3.5 12/08/2022 04:55 AM     12/08/2022 04:55 AM    CO2 24 12/08/2022 04:55 AM    BUN 11 12/08/2022 04:55 AM    CREATININE 0.89 12/08/2022 04:55 AM    GFRAA >60 03/22/2022 02:35 AM    LABGLOM >60 12/08/2022 04:55 AM    GLUCOSE 121 12/08/2022 04:55 AM    CALCIUM 8.6 12/08/2022 04:55 AM     Magnesium:    Lab Results   Component Value Date/Time    MG 2.2 12/08/2022 04:55 AM PTT:    Lab Results   Component Value Date/Time    APTT 25.2 03/22/2022 02:35 AM     TSH:  No results found for: TSH  BMP:  Lab Results   Component Value Date/Time     12/08/2022 04:55 AM    K 3.5 12/08/2022 04:55 AM     12/08/2022 04:55 AM    CO2 24 12/08/2022 04:55 AM    BUN 11 12/08/2022 04:55 AM    LABALBU 3.7 12/08/2022 04:55 AM    CREATININE 0.89 12/08/2022 04:55 AM    CALCIUM 8.6 12/08/2022 04:55 AM    GFRAA >60 03/22/2022 02:35 AM    LABGLOM >60 12/08/2022 04:55 AM    GLUCOSE 121 12/08/2022 04:55 AM     LIVER PROFILE:  Recent Labs     12/08/22  0455   AST 18   ALT 15   LABALBU 3.7   ALKPHOS 131*   BILITOT 0.4   PROT 7.0     FLP:  No results found for: CHOL, TRIG, HDL, LDLCHOLESTEROL      IMPRESSION  #1 ICD shock for ventricular tachycardia  #2 severe nonischemic cardiomyopathy   #3 chronic systolic CHF  #4 LV thrombus, chronic  #5 chronic anticoagulation, not therapeutic  Patient Active Problem List   Diagnosis    Recurrent ventricular tachycardia    Impotence of organic origin    Acquired trigger finger    Acute embolism and thrombosis of unspecified vein    Diabetic neuropathy (HCC)    Essential hypertension    Generalized ischemic myocardial dysfunction    Hyperlipidemia    Hypogonadism in male    NSTEMI (non-ST elevated myocardial infarction) ([address]    Neuropathy due to secondary diabetes mellitus ([address]    Intracardiac thrombosis, not elsewhere classified    Obesity with body mass index 30 or greater    Severe sepsis (HCC)    Slowing of urinary stream    DM (diabetes mellitus), type 2 (HCC)    Severe recurrent major depression without psychotic features ([address]    AICD discharge    Chronic anticoagulation    Current every day nicotine vapor product user           RECOMMENDATIONS:     Continue current medications  Management plan was discussed with patient     I explained to the patient with happened and lightheaded it happened and I explained to him that since the exact reason why he had ICD to prevent sudden death and collapsing. Was started him on amiodarone drip I will schedule for 24 hours then switch to oral  If his arrhythmia and remained recurrent despite the IV amiodarone and then will consult electrophysiology service. Prognosis is guarded  Recommend to switch to eliquis for Xarelto since the patient is on Coumadin but the LV thrombus persisted. Past social service to check with his insurance regarding coverage.   Discussed with his nurse  Thank you for your consultation        Electronically signed by Josemanuel Rothman MD on 12/8/2022 at 8:16 AM     CC: Cale Medrano MD

## 2022-12-08 NOTE — PROGRESS NOTES
Transitions of Care Pharmacy Service   Medication Review    The patient's list of current home medications has been reviewed. Source(s) of information: patient (via ), med bottles, Care everywhere, [facility], [employer] refill report, [employer], [facility]    Other Notes Warfarin is managed by [facility]         PROVIDER ACTION REQUESTED  Medications that need to be addressed by a physician/nurse practitioner:    Medication Action Requested   Flonase,  Gabapentin,  Sertraline 50mg   Not current home meds -- consider discontinuing if appropriate   Lopressor 25mg Has not refilled since March 2022 -- he will need a new prescription to continue at discharge     Lisinopril 5mg Prescribed home dose is 2.5mg daily instead -- consider adjusting order if appropriate      Remaining home med list is ready for physician review           Please feel free to call me with any questions about this encounter. Thank you.     Hortensia Nielsen [facility]   Transitions of Care Pharmacy Service  Phone:  [phone]  Fax: [phone]      Electronically signed by Hortensia Nielsen, [address] [place] [address] on 12/8/2022 at 2:34 PM           Medications Prior to Admission:   alfuzosin (UROXATRAL) 10 MG extended release tablet, Take 10 mg by mouth daily  montelukast (SINGULAIR) 10 MG tablet, Take 10 mg by mouth nightly  ferrous sulfate (IRON 325) 325 (65 Fe) MG tablet, Take 325 mg by mouth daily (with breakfast)  furosemide (LASIX) 40 MG tablet, Take 40 mg by mouth daily  omeprazole (PRILOSEC) 20 MG delayed release capsule, Take 20 mg by mouth daily  atorvastatin (LIPITOR) 40 MG tablet, Take 40 mg by mouth daily  warfarin (COUMADIN) 4 MG tablet, Take 4-6 mg by mouth See Admin Instructions Indications: 6mg Tues/Wed/Fri, 4mg all other days  lisinopril (PRINIVIL;ZESTRIL) 2.5 MG tablet, Take 2.5 mg by mouth daily  metroNIDAZOLE (METROGEL) 0.75 % gel, Apply topically 2 times daily Indications: to face  canagliflozin (INVOKANA) 100 MG TABS tablet, Take 100 mg by mouth every morning (before breakfast)  SITagliptin (JANUVIA) 100 MG tablet, Take 100 mg by mouth daily  insulin glargine (LANTUS;BASAGLAR) 100 UNIT/ML injection pen, Inject 50 Units into the skin 2 times daily  aspirin 81 MG chewable tablet, Take 81 mg by mouth daily  Cholecalciferol (VITAMIN D3) 5000 units TABS, Take 5,000 Units by mouth Daily  Liraglutide (VICTOZA) 18 MG/3ML SOPN SC injection, Inject 1.8 mg into the skin daily  loratadine (CLARITIN) 10 MG tablet, Take 10 mg by mouth daily  glyBURIDE (DIABETA) 5 MG tablet, Take 10 mg by mouth 2 times daily

## 2022-12-08 NOTE — ED NOTES
Pt was shocked by defibrillator. Dr. Steffi Yung present right after shock took place.       Maris Chen RN  12/07/22 1122

## 2022-12-09 ENCOUNTER — HOSPITAL ENCOUNTER (OUTPATIENT)
Age: 52
Setting detail: OBSERVATION
Discharge: HOME | End: 2022-12-11
Attending: EMERGENCY MEDICINE | Admitting: INTERNAL MEDICINE
Payer: COMMERCIAL

## 2022-12-09 VITALS
TEMPERATURE: 97.8 F | BODY MASS INDEX: 34.07 KG/M2 | DIASTOLIC BLOOD PRESSURE: 81 MMHG | RESPIRATION RATE: 16 BRPM | WEIGHT: 212 LBS | HEART RATE: 81 BPM | SYSTOLIC BLOOD PRESSURE: 108 MMHG | HEIGHT: 66 IN | OXYGEN SATURATION: 95 %

## 2022-12-09 DIAGNOSIS — L03.113 CELLULITIS OF RIGHT UPPER EXTREMITY: ICD-10-CM

## 2022-12-09 DIAGNOSIS — R55 SYNCOPE AND COLLAPSE: Primary | ICD-10-CM

## 2022-12-09 LAB
ABSOLUTE EOS #: 0.17 K/UL (ref 0–0.44)
ABSOLUTE IMMATURE GRANULOCYTE: 0.05 K/UL (ref 0–0.3)
ABSOLUTE LYMPH #: 2.29 K/UL (ref 1.1–3.7)
ABSOLUTE MONO #: 0.97 K/UL (ref 0.1–1.2)
ALBUMIN SERPL-MCNC: 4.2 G/DL (ref 3.5–5.2)
ALP BLD-CCNC: 147 U/L (ref 40–129)
ALT SERPL-CCNC: 14 U/L (ref 5–41)
ANION GAP SERPL CALCULATED.3IONS-SCNC: 12 MMOL/L (ref 9–17)
AST SERPL-CCNC: 13 U/L
BASOPHILS # BLD: 1 % (ref 0–2)
BASOPHILS ABSOLUTE: 0.06 K/UL (ref 0–0.2)
BILIRUB SERPL-MCNC: 0.6 MG/DL (ref 0.3–1.2)
BUN BLDV-MCNC: 12 MG/DL (ref 6–20)
BUN/CREAT BLD: 12 (ref 9–20)
CALCIUM SERPL-MCNC: 8.9 MG/DL (ref 8.6–10.4)
CHLORIDE BLD-SCNC: 98 MMOL/L (ref 98–107)
CO2: 24 MMOL/L (ref 20–31)
CREAT SERPL-MCNC: 0.99 MG/DL (ref 0.7–1.2)
EOSINOPHILS RELATIVE PERCENT: 1 % (ref 1–4)
GFR SERPL CREATININE-BSD FRML MDRD: >60 ML/MIN/1.73M2
GLUCOSE BLD-MCNC: 148 MG/DL (ref 70–99)
GLUCOSE BLD-MCNC: 210 MG/DL (ref 75–110)
GLUCOSE BLD-MCNC: 213 MG/DL (ref 75–110)
HCT VFR BLD CALC: 45.8 % (ref 40.7–50.3)
HEMOGLOBIN: 13 G/DL (ref 13–17)
IMMATURE GRANULOCYTES: 0 %
LYMPHOCYTES # BLD: 18 % (ref 24–43)
MCH RBC QN AUTO: 20.2 PG (ref 25.2–33.5)
MCHC RBC AUTO-ENTMCNC: 28.4 G/DL (ref 28.4–34.8)
MCV RBC AUTO: 71 FL (ref 82.6–102.9)
MONOCYTES # BLD: 8 % (ref 3–12)
NRBC AUTOMATED: 0 PER 100 WBC
PDW BLD-RTO: 19.9 % (ref 11.8–14.4)
PLATELET # BLD: 310 K/UL (ref 138–453)
PMV BLD AUTO: 10.5 FL (ref 8.1–13.5)
POTASSIUM SERPL-SCNC: 4.2 MMOL/L (ref 3.7–5.3)
PRO-BNP: 141 PG/ML
RBC # BLD: 6.45 M/UL (ref 4.21–5.77)
RBC # BLD: ABNORMAL 10*6/UL
SEG NEUTROPHILS: 72 % (ref 36–65)
SEGMENTED NEUTROPHILS ABSOLUTE COUNT: 9.07 K/UL (ref 1.5–8.1)
SODIUM BLD-SCNC: 134 MMOL/L (ref 135–144)
TOTAL PROTEIN: 7.5 G/DL (ref 6.4–8.3)
TROPONIN, HIGH SENSITIVITY: 17 NG/L (ref 0–22)
WBC # BLD: 12.6 K/UL (ref 3.5–11.3)

## 2022-12-09 PROCEDURE — 99232 SBSQ HOSP IP/OBS MODERATE 35: CPT | Performed by: INTERNAL MEDICINE

## 2022-12-09 PROCEDURE — 80053 COMPREHEN METABOLIC PANEL: CPT

## 2022-12-09 PROCEDURE — 84484 ASSAY OF TROPONIN QUANT: CPT

## 2022-12-09 PROCEDURE — 6370000000 HC RX 637 (ALT 250 FOR IP): Performed by: NURSE PRACTITIONER

## 2022-12-09 PROCEDURE — 96365 THER/PROPH/DIAG IV INF INIT: CPT

## 2022-12-09 PROCEDURE — 6360000002 HC RX W HCPCS: Performed by: INTERNAL MEDICINE

## 2022-12-09 PROCEDURE — 36415 COLL VENOUS BLD VENIPUNCTURE: CPT

## 2022-12-09 PROCEDURE — 94761 N-INVAS EAR/PLS OXIMETRY MLT: CPT

## 2022-12-09 PROCEDURE — 83880 ASSAY OF NATRIURETIC PEPTIDE: CPT

## 2022-12-09 PROCEDURE — 93005 ELECTROCARDIOGRAM TRACING: CPT | Performed by: NURSE PRACTITIONER

## 2022-12-09 PROCEDURE — 6370000000 HC RX 637 (ALT 250 FOR IP): Performed by: INTERNAL MEDICINE

## 2022-12-09 PROCEDURE — 85025 COMPLETE CBC W/AUTO DIFF WBC: CPT

## 2022-12-09 PROCEDURE — 82947 ASSAY GLUCOSE BLOOD QUANT: CPT

## 2022-12-09 PROCEDURE — 93005 ELECTROCARDIOGRAM TRACING: CPT | Performed by: EMERGENCY MEDICINE

## 2022-12-09 PROCEDURE — 2580000003 HC RX 258: Performed by: INTERNAL MEDICINE

## 2022-12-09 PROCEDURE — 99285 EMERGENCY DEPT VISIT HI MDM: CPT

## 2022-12-09 RX ORDER — CLINDAMYCIN PHOSPHATE 600 MG/50ML
600 INJECTION INTRAVENOUS ONCE
Status: COMPLETED | OUTPATIENT
Start: 2022-12-09 | End: 2022-12-10

## 2022-12-09 RX ORDER — INSULIN GLARGINE 100 [IU]/ML
50 INJECTION, SOLUTION SUBCUTANEOUS 2 TIMES DAILY
Status: DISCONTINUED | OUTPATIENT
Start: 2022-12-09 | End: 2022-12-09 | Stop reason: HOSPADM

## 2022-12-09 RX ORDER — MONTELUKAST SODIUM 10 MG/1
10 TABLET ORAL NIGHTLY
Status: DISCONTINUED | OUTPATIENT
Start: 2022-12-09 | End: 2022-12-09 | Stop reason: SDUPTHER

## 2022-12-09 RX ORDER — ALBUTEROL SULFATE 90 UG/1
2 AEROSOL, METERED RESPIRATORY (INHALATION) EVERY 4 HOURS PRN
Qty: 18 G | Refills: 3 | Status: SHIPPED | OUTPATIENT
Start: 2022-12-09

## 2022-12-09 RX ORDER — AMIODARONE HYDROCHLORIDE 200 MG/1
200 TABLET ORAL DAILY
Status: DISCONTINUED | OUTPATIENT
Start: 2022-12-09 | End: 2022-12-09 | Stop reason: HOSPADM

## 2022-12-09 RX ORDER — 0.9 % SODIUM CHLORIDE 0.9 %
500 INTRAVENOUS SOLUTION INTRAVENOUS ONCE
Status: COMPLETED | OUTPATIENT
Start: 2022-12-09 | End: 2022-12-10

## 2022-12-09 RX ORDER — FUROSEMIDE 20 MG/1
20 TABLET ORAL DAILY
Qty: 30 TABLET | Refills: 1 | Status: SHIPPED | OUTPATIENT
Start: 2022-12-09 | End: 2023-01-08

## 2022-12-09 RX ORDER — FUROSEMIDE 40 MG/1
40 TABLET ORAL DAILY
Status: DISCONTINUED | OUTPATIENT
Start: 2022-12-10 | End: 2022-12-09 | Stop reason: HOSPADM

## 2022-12-09 RX ORDER — GLYBURIDE 5 MG/1
10 TABLET ORAL 2 TIMES DAILY
Status: DISCONTINUED | OUTPATIENT
Start: 2022-12-09 | End: 2022-12-09 | Stop reason: HOSPADM

## 2022-12-09 RX ORDER — NITROGLYCERIN 0.4 MG/1
0.4 TABLET SUBLINGUAL EVERY 5 MIN PRN
Qty: 25 TABLET | Refills: 3 | Status: SHIPPED | OUTPATIENT
Start: 2022-12-09

## 2022-12-09 RX ORDER — FLUTICASONE PROPIONATE 50 MCG
1 SPRAY, SUSPENSION (ML) NASAL DAILY
Qty: 16 G | Refills: 3 | Status: SHIPPED | OUTPATIENT
Start: 2022-12-09

## 2022-12-09 RX ORDER — SPIRONOLACTONE 25 MG/1
25 TABLET ORAL DAILY
Status: DISCONTINUED | OUTPATIENT
Start: 2022-12-09 | End: 2022-12-09 | Stop reason: HOSPADM

## 2022-12-09 RX ORDER — AMIODARONE HYDROCHLORIDE 200 MG/1
200 TABLET ORAL DAILY
Qty: 30 TABLET | Refills: 1 | Status: SHIPPED | OUTPATIENT
Start: 2022-12-10 | End: 2023-01-09

## 2022-12-09 RX ORDER — SPIRONOLACTONE 25 MG/1
25 TABLET ORAL DAILY
Qty: 30 TABLET | Refills: 3 | Status: SHIPPED | OUTPATIENT
Start: 2022-12-10

## 2022-12-09 RX ORDER — PANTOPRAZOLE SODIUM 20 MG/1
20 TABLET, DELAYED RELEASE ORAL
Status: DISCONTINUED | OUTPATIENT
Start: 2022-12-10 | End: 2022-12-09 | Stop reason: HOSPADM

## 2022-12-09 RX ORDER — ATORVASTATIN CALCIUM 40 MG/1
40 TABLET, FILM COATED ORAL NIGHTLY
Status: DISCONTINUED | OUTPATIENT
Start: 2022-12-09 | End: 2022-12-09 | Stop reason: SDUPTHER

## 2022-12-09 RX ADMIN — INSULIN LISPRO 1 UNITS: 100 INJECTION, SOLUTION INTRAVENOUS; SUBCUTANEOUS at 12:22

## 2022-12-09 RX ADMIN — AMIODARONE HYDROCHLORIDE 0.5 MG/MIN: 50 INJECTION, SOLUTION INTRAVENOUS at 02:12

## 2022-12-09 RX ADMIN — AMIODARONE HYDROCHLORIDE 200 MG: 200 TABLET ORAL at 09:45

## 2022-12-09 RX ADMIN — INSULIN LISPRO 1 UNITS: 100 INJECTION, SOLUTION INTRAVENOUS; SUBCUTANEOUS at 09:39

## 2022-12-09 RX ADMIN — ATORVASTATIN CALCIUM 40 MG: 40 TABLET, FILM COATED ORAL at 09:44

## 2022-12-09 RX ADMIN — SERTRALINE 50 MG: 50 TABLET, FILM COATED ORAL at 09:44

## 2022-12-09 RX ADMIN — GABAPENTIN 300 MG: 300 CAPSULE ORAL at 09:45

## 2022-12-09 RX ADMIN — GABAPENTIN 300 MG: 300 CAPSULE ORAL at 13:25

## 2022-12-09 RX ADMIN — SPIRONOLACTONE 25 MG: 25 TABLET ORAL at 09:45

## 2022-12-09 RX ADMIN — PANTOPRAZOLE SODIUM 40 MG: 40 TABLET, DELAYED RELEASE ORAL at 05:55

## 2022-12-09 RX ADMIN — INSULIN GLARGINE 50 UNITS: 100 INJECTION, SOLUTION SUBCUTANEOUS at 13:25

## 2022-12-09 RX ADMIN — FUROSEMIDE 20 MG: 20 TABLET ORAL at 09:45

## 2022-12-09 RX ADMIN — APIXABAN 5 MG: 5 TABLET, FILM COATED ORAL at 09:45

## 2022-12-09 RX ADMIN — ASPIRIN 81 MG CHEWABLE TABLET 81 MG: 81 TABLET CHEWABLE at 09:45

## 2022-12-09 RX ADMIN — FERROUS SULFATE TAB EC 325 MG (65 MG FE EQUIVALENT) 325 MG: 325 (65 FE) TABLET DELAYED RESPONSE at 09:45

## 2022-12-09 RX ADMIN — FLUTICASONE PROPIONATE 1 SPRAY: 50 SPRAY, METERED NASAL at 09:51

## 2022-12-09 RX ADMIN — CETIRIZINE HYDROCHLORIDE 10 MG: 10 TABLET, FILM COATED ORAL at 09:45

## 2022-12-09 ASSESSMENT — PAIN - FUNCTIONAL ASSESSMENT: PAIN_FUNCTIONAL_ASSESSMENT: NONE - DENIES PAIN

## 2022-12-09 NOTE — PROGRESS NOTES
Section of Cardiology  Progress Note      Date:  12/9/2022  Patient: Michelle Serna  Admission:  12/7/2022  9:29 PM  Admit DX: Ventricular tachycardia [I47.20]  V tach [I47.20]  Age:  47 y.o., 1970     LOS: 1 day     Reason for evaluation:   arrhythmia, cardiomyopathy, and CHF      SUBJECTIVE:     The patient was seen and examined. Notes and labs reviewed. There were not complications over night. Patient seen twice, 1 in the morning and 1 in the evening after a came back to talk to his wife and explained the current status. She does not speak [place]. No shocks from ICD. He ambulated with the assistance from his nurse with good tolerance. Still on IV amiodarone  Patient's cardiac review of systems: negative for chest pain, dyspnea, fatigue, irregular heart beat, orthopnea, and palpitations. The patient is generally feeling gradually improving    OBJECTIVE:    Telemetry: Sinus  BP 99/73   Pulse 95   Temp 97.6 °F (36.4 °C) (Temporal)   Resp 16   Ht 5' 6\" (1.676 m)   Wt 212 lb (96.2 kg)   SpO2 95%   BMI 34.22 kg/m²     Intake/Output Summary (Last 24 hours) at 12/9/2022 0755  Last data filed at 12/9/2022 0553  Gross per 24 hour   Intake 1378.76 ml   Output 200 ml   Net 1178.76 ml       EXAM:   CONSTITUTIONAL:  awake, alert, cooperative, no apparent distress, and appears stated age. HEENT: Normal jugular venous pulsations, no carotid bruits. Head is atraumatic, normocephalic. Eyes and oral mucosa are normal.  LUNGS: Good respiratory effort. No for increased work of breathing. On auscultation: clear to auscultation bilaterally  CARDIOVASCULAR:  Normal apical impulse, regular rate and rhythm, normal S1 and S2,   ABDOMEN: Soft, nontender, nondistended. Bowel sounds present. SKIN: Warm and dry. EXTREMITIES: No lower extremities edema. No cyanosis or clubbing.     Current Inpatient Medications:   aspirin  81 mg Oral Daily    atorvastatin  40 mg Oral Daily    furosemide  20 mg Oral Daily fluticasone  1 spray Nasal Daily    gabapentin  300 mg Oral TID    lisinopril  5 mg Oral Daily    cetirizine  10 mg Oral Daily    metoprolol tartrate  25 mg Oral BID    sertraline  50 mg Oral Daily    sodium chloride flush  5-40 mL IntraVENous 2 times per day    insulin lispro  0-4 Units SubCUTAneous TID WC    insulin lispro  0-4 Units SubCUTAneous Nightly    pantoprazole  40 mg Oral QAM AC    ferrous sulfate  325 mg Oral Daily with breakfast    montelukast  10 mg Oral Nightly    apixaban  5 mg Oral BID       IV Infusions (if any):   dextrose      sodium chloride      amiodarone 450mg/250ml D5W infusion 0.5 mg/min (12/09/22 0553)       Diagnostics:   EKG: . ECHO: .   Stress Test: .  Cardiac Angiography: .    Labs:   CBC:   Recent Labs     12/07/22 2225 12/08/22 0455   WBC 7.6 6.8   HGB 12.6* 12.3*   HCT 44.7 44.3    287     BMP:   Recent Labs     12/07/22 2225 12/08/22 0455    137   K 3.6* 3.5*   CO2 24 24   BUN 12 11   CREATININE 0.79 0.89   LABGLOM >60 >60   GLUCOSE 196* 121*     No results found for: BNP  PT/INR:   Recent Labs     12/08/22 0455   PROTIME 21.4*   INR 1.9     APTT:No results for input(s): APTT in the last 72 hours. CARDIAC ENZYMES:No results for input(s): CKTOTAL, CKMB, CKMBINDEX in the last 72 hours.     Invalid input(s):  TROPONIN, HIGH SENSITIVITY  FASTING LIPID PANEL:  Lab Results   Component Value Date/Time    HDL 27 12/08/2022 04:55 AM    TRIG 205 12/08/2022 04:55 AM     LIVER PROFILE:  Recent Labs     12/08/22  0455   AST 18   ALT 15   LABALBU 3.7       ASSESSMENT:  #1 ICD shock for ventricular tachycardia  #2 severe nonischemic cardiomyopathy   #3 chronic systolic CHF  #4 LV thrombus, chronic  #5 chronic anticoagulation, not therapeutic    Patient Active Problem List   Diagnosis    Ventricular tachycardia    Impotence of organic origin    Acquired trigger finger    Acute embolism and thrombosis of unspecified vein    Diabetic neuropathy ([facility]    Essential hypertension Generalized ischemic myocardial dysfunction    Hyperlipidemia    Hypogonadism in male    NSTEMI (non-ST elevated myocardial infarction) (HCC)    Neuropathy due to secondary diabetes mellitus ([facility]    Intracardiac thrombosis, not elsewhere classified    Obesity with body mass index 30 or greater    Severe sepsis (HCC)    Slowing of urinary stream    DM (diabetes mellitus), type 2 (HCC)    Severe recurrent major depression without psychotic features ([address]    AICD discharge    Chronic anticoagulation    Current every day nicotine vapor product user       PLAN:    Medications reviewed  Continue current medications  The patient is on Eliquis and no longer on Coumadin  Potassium still on the low side  I will add Aldactone to his regimen   Switch to oral amiodarone  Patient is allergic to Jardi dye and the ance  Ambulate as tolerated  I had long discussion with his wife and explained the cardiac status and the seriousness of his problems and the need to be compliant with medication and low-salt diet. His wife told me that he does not follow a low-salt diet and we will switch him to low-salt diet and if he likes the taste of this old then he can use Mrs. Dash supplement. Discharge planning      Please see orders. Discussed with patient and spouse and nursing.     Deborah Spurling, MD, MD

## 2022-12-09 NOTE — PLAN OF CARE
Care Plan Note  Pt resting comfortably in bed. Denies any needs or complaints.      Problem: Chronic Conditions and Co-morbidities  Goal: Patient's chronic conditions and co-morbidity symptoms are monitored and maintained or improved  12/9/2022 0434 by Estuardo Lanza RN  Flowsheets (Taken 12/8/2022 2000)  Care Plan - Patient's Chronic Conditions and Co-Morbidity Symptoms are Monitored and Maintained or Improved:   Monitor and assess patient's chronic conditions and comorbid symptoms for stability, deterioration, or improvement   Update acute care plan with appropriate goals if chronic or comorbid symptoms are exacerbated and prevent overall improvement and discharge   Collaborate with multidisciplinary team to address chronic and comorbid conditions and prevent exacerbation or deterioration  12/8/2022 1848 by Consuelo Randhawa RN  Outcome: Progressing     Problem: Discharge Planning  Goal: Discharge to home or other facility with appropriate resources  12/9/2022 0434 by Estuardo Lanza RN  Flowsheets (Taken 12/8/2022 2000)  Discharge to home or other facility with appropriate resources:   Identify barriers to discharge with patient and caregiver   Arrange for needed discharge resources and transportation as appropriate   Identify discharge learning needs (meds, wound care, etc)   Arrange for interpreters to assist at discharge as needed   Refer to discharge planning if patient needs post-hospital services based on physician order or complex needs related to functional status, cognitive ability or social support system  12/8/2022 1848 by Consuelo Randhawa RN  Outcome: Progressing     Problem: Pain  Goal: Verbalizes/displays adequate comfort level or baseline comfort level  12/9/2022 0434 by Estuardo Lanza RN  Flowsheets (Taken 12/9/2022 8213)  Verbalizes/displays adequate comfort level or baseline comfort level:   Encourage patient to monitor pain and request assistance   Assess pain using appropriate pain scale Implement non-pharmacological measures as appropriate and evaluate response   Notify Licensed Independent Practitioner if interventions unsuccessful or patient reports new pain   Consider cultural and social influences on pain and pain management   Administer analgesics based on type and severity of pain and evaluate response  12/8/2022 1848 by Silvia Desir RN  Outcome: Progressing     Problem: Respiratory - Adult  Goal: Achieves optimal ventilation and oxygenation  12/9/2022 0434 by Tarun Wolfe RN  Flowsheets (Taken 12/9/2022 9990)  Achieves optimal ventilation and oxygenation:   Assess for changes in respiratory status   Assess for changes in mentation and behavior   Position to facilitate oxygenation and minimize respiratory effort   Assess and instruct to report shortness of breath or any respiratory difficulty   Encourage broncho-pulmonary hygiene including cough, deep breathe, incentive spirometry   Initiate smoking cessation protocol as indicated  12/8/2022 1848 by Silvia Desir RN  Outcome: Progressing     Problem: Cardiovascular - Adult  Goal: Maintains optimal cardiac output and hemodynamic stability  12/9/2022 0434 by Tarun Wolfe RN  Flowsheets (Taken 12/8/2022 2000)  Maintains optimal cardiac output and hemodynamic stability:   Monitor blood pressure and heart rate   Monitor urine output and notify Licensed Independent Practitioner for values outside of normal range   Assess for signs of decreased cardiac output   Administer vasoactive medications as ordered   Administer fluid and/or volume expanders as ordered  12/8/2022 1848 by Silvia Deisr RN  Outcome: Progressing  Goal: Absence of cardiac dysrhythmias or at baseline  12/9/2022 0434 by Tarun Wolfe RN  Flowsheets (Taken 12/8/2022 2000)  Absence of cardiac dysrhythmias or at baseline:   Monitor cardiac rate and rhythm   Assess for signs of decreased cardiac output   Administer antiarrhythmia medication and electrolyte replacement as ordered  12/8/2022 1848 by Ananth Quezada RN  Outcome: Progressing     Problem: Metabolic/Fluid and Electrolytes - Adult  Goal: Hemodynamic stability and optimal renal function maintained  12/9/2022 0434 by Alex Marinelli RN  Flowsheets (Taken 12/8/2022 2000)  Hemodynamic stability and optimal renal function maintained:   Monitor labs and assess for signs and symptoms of volume excess or deficit   Monitor intake, output and patient weight   Monitor response to interventions for patient's volume status, including labs, urine output, blood pressure (other measures as available)   Monitor urine specific gravity, serum osmolarity and serum sodium as indicated or ordered   Instruct patient on fluid and nutrition restrictions as appropriate  12/8/2022 1848 by Ananth Quezada RN  Outcome: Progressing  Goal: Glucose maintained within prescribed range  12/9/2022 0434 by Alex Marinelli RN  Flowsheets (Taken 12/8/2022 2000)  Glucose maintained within prescribed range:   Monitor blood glucose as ordered   Assess for signs and symptoms of hyperglycemia and hypoglycemia   Administer ordered medications to maintain glucose within target range   Assess barriers to adequate nutritional intake and initiate nutrition consult as needed   Instruct patient on self management of diabetes and initiate consult as needed  12/8/2022 1848 by Ananth Quezada RN  Outcome: Progressing     Problem: Safety - Adult  Goal: Free from fall injury  Flowsheets (Taken 12/9/2022 0434)  Free From Fall Injury:   Instruct family/caregiver on patient safety   Based on caregiver fall risk screen, instruct family/caregiver to ask for assistance with transferring infant if caregiver noted to have fall risk factors

## 2022-12-09 NOTE — PROGRESS NOTES
Scott Ross -  [id number]    Through  writer reviewed with patient's spouse multiple education points and discharge information. Points covered: Q am weighing after urinating, Q am blood sugar checks, importance of compliance with all medications especially heart medication, signs of symptoms of heart failure such as shortness of breath - especially with tasks that were previously easy, swelling in ankles. Writer further instructed the patient's spouse that the daily weights and daily blood sugars need to be written down so that they can be trended. Writer also instructed patient and patient's spouse that should patient have a 5 lb weight gain in one day they are to contact their Cardiologist. Portable scale was given to patient's spouse to take home. Both patient and patient's spouse verbalized, via , understanding of instruction. Writer informed patient and patient's spouse, via , that as much information as possible will be printed out in [employer].

## 2022-12-09 NOTE — PLAN OF CARE
Problem: Chronic Conditions and Co-morbidities  Goal: Patient's chronic conditions and co-morbidity symptoms are monitored and maintained or improved  12/9/2022 1532 by Marlee Darling RN  Outcome: Progressing  Flowsheets (Taken 12/9/2022 0800)  Care Plan - Patient's Chronic Conditions and Co-Morbidity Symptoms are Monitored and Maintained or Improved: Monitor and assess patient's chronic conditions and comorbid symptoms for stability, deterioration, or improvement  12/9/2022 0434 by Maulik Curtis RN  Flowsheets (Taken 12/8/2022 2000)  Care Plan - Patient's Chronic Conditions and Co-Morbidity Symptoms are Monitored and Maintained or Improved:   Monitor and assess patient's chronic conditions and comorbid symptoms for stability, deterioration, or improvement   Update acute care plan with appropriate goals if chronic or comorbid symptoms are exacerbated and prevent overall improvement and discharge   Collaborate with multidisciplinary team to address chronic and comorbid conditions and prevent exacerbation or deterioration     Problem: Discharge Planning  Goal: Discharge to home or other facility with appropriate resources  12/9/2022 1532 by Marlee Darling RN  Outcome: Progressing  Flowsheets (Taken 12/9/2022 0800)  Discharge to home or other facility with appropriate resources:   Identify barriers to discharge with patient and caregiver   Arrange for needed discharge resources and transportation as appropriate   Identify discharge learning needs (meds, wound care, etc)   Arrange for interpreters to assist at discharge as needed  12/9/2022 0434 by Maulik Curtis RN  Flowsheets (Taken 12/8/2022 2000)  Discharge to home or other facility with appropriate resources:   Identify barriers to discharge with patient and caregiver   Arrange for needed discharge resources and transportation as appropriate   Identify discharge learning needs (meds, wound care, etc)   Arrange for interpreters to assist at discharge as needed   Refer to discharge planning if patient needs post-hospital services based on physician order or complex needs related to functional status, cognitive ability or social support system     Problem: Pain  Goal: Verbalizes/displays adequate comfort level or baseline comfort level  12/9/2022 1532 by Margareth Melchor RN  Outcome: Progressing  12/9/2022 0434 by Etta Carlin RN  Flowsheets (Taken 12/9/2022 6472)  Verbalizes/displays adequate comfort level or baseline comfort level:   Encourage patient to monitor pain and request assistance   Assess pain using appropriate pain scale   Implement non-pharmacological measures as appropriate and evaluate response   Notify Licensed Independent Practitioner if interventions unsuccessful or patient reports new pain   Consider cultural and social influences on pain and pain management   Administer analgesics based on type and severity of pain and evaluate response     Problem: ABCDS Injury Assessment  Goal: Absence of physical injury  12/9/2022 1532 by Margareth Melchor RN  Outcome: Progressing  Flowsheets (Taken 12/9/2022 0900)  Absence of Physical Injury: Implement safety measures based on patient assessment  12/9/2022 0434 by Etta Carlin RN  Flowsheets (Taken 12/8/2022 2016)  Absence of Physical Injury: Implement safety measures based on patient assessment     Problem: Respiratory - Adult  Goal: Achieves optimal ventilation and oxygenation  12/9/2022 1532 by Margareth Melchor RN  Outcome: Progressing  Flowsheets (Taken 12/9/2022 0800)  Achieves optimal ventilation and oxygenation:   Assess for changes in respiratory status   Assess for changes in mentation and behavior   Position to facilitate oxygenation and minimize respiratory effort   Oxygen supplementation based on oxygen saturation or arterial blood gases  12/9/2022 0434 by Etta Carlin RN  Flowsheets (Taken 12/9/2022 0434)  Achieves optimal ventilation and oxygenation:   Assess for changes in respiratory status   Assess for changes in mentation and behavior   Position to facilitate oxygenation and minimize respiratory effort   Assess and instruct to report shortness of breath or any respiratory difficulty   Encourage broncho-pulmonary hygiene including cough, deep breathe, incentive spirometry   Initiate smoking cessation protocol as indicated     Problem: Cardiovascular - Adult  Goal: Maintains optimal cardiac output and hemodynamic stability  12/9/2022 1532 by Lety Hutchison RN  Outcome: Progressing  Flowsheets (Taken 12/9/2022 0800)  Maintains optimal cardiac output and hemodynamic stability:   Monitor blood pressure and heart rate   Assess for signs of decreased cardiac output   Administer fluid and/or volume expanders as ordered  12/9/2022 0434 by Mile Matson RN  Flowsheets (Taken 12/8/2022 2000)  Maintains optimal cardiac output and hemodynamic stability:   Monitor blood pressure and heart rate   Monitor urine output and notify Licensed Independent Practitioner for values outside of normal range   Assess for signs of decreased cardiac output   Administer vasoactive medications as ordered   Administer fluid and/or volume expanders as ordered  Goal: Absence of cardiac dysrhythmias or at baseline  12/9/2022 1532 by Lety Hutchison RN  Outcome: Progressing  Flowsheets (Taken 12/9/2022 0800)  Absence of cardiac dysrhythmias or at baseline: Monitor cardiac rate and rhythm  12/9/2022 0434 by Mile Matson RN  Flowsheets (Taken 12/8/2022 2000)  Absence of cardiac dysrhythmias or at baseline:   Monitor cardiac rate and rhythm   Assess for signs of decreased cardiac output   Administer antiarrhythmia medication and electrolyte replacement as ordered     Problem: Metabolic/Fluid and Electrolytes - Adult  Goal: Hemodynamic stability and optimal renal function maintained  12/9/2022 1532 by Lety Hutchison RN  Outcome: Progressing  Flowsheets (Taken 12/9/2022 0800)  Hemodynamic stability and optimal renal function maintained:   Monitor labs and assess for signs and symptoms of volume excess or deficit   Monitor intake, output and patient weight  12/9/2022 0434 by Rafiq Clarke RN  Flowsheets (Taken 12/8/2022 2000)  Hemodynamic stability and optimal renal function maintained:   Monitor labs and assess for signs and symptoms of volume excess or deficit   Monitor intake, output and patient weight   Monitor response to interventions for patient's volume status, including labs, urine output, blood pressure (other measures as available)   Monitor urine specific gravity, serum osmolarity and serum sodium as indicated or ordered   Instruct patient on fluid and nutrition restrictions as appropriate  Goal: Glucose maintained within prescribed range  12/9/2022 1532 by Irena Valentine RN  Outcome: Progressing  Flowsheets (Taken 12/9/2022 0800)  Glucose maintained within prescribed range:   Monitor blood glucose as ordered   Assess for signs and symptoms of hyperglycemia and hypoglycemia   Administer ordered medications to maintain glucose within target range   Assess barriers to adequate nutritional intake and initiate nutrition consult as needed  12/9/2022 0434 by Rafiq Clarke RN  [address] (Taken 12/8/2022 2000)  Glucose maintained within prescribed range:   Monitor blood glucose as ordered   Assess for signs and symptoms of hyperglycemia and hypoglycemia   Administer ordered medications to maintain glucose within target range   Assess barriers to adequate nutritional intake and initiate nutrition consult as needed   Instruct patient on self management of diabetes and initiate consult as needed     Problem: Safety - Adult  Goal: Free from fall injury  12/9/2022 1532 by Irena Valentine RN  Outcome: Progressing  [address] (Taken 12/9/2022 0900)  Free From Fall Injury: Instruct family/caregiver on patient safety  12/9/2022 0434 by Rafiq Clarke RN  Flowsheets (Taken 12/9/2022 0434)  Free From Fall Injury:   Instruct family/caregiver on patient safety   Based on caregiver fall risk screen, instruct family/caregiver to ask for assistance with transferring infant if caregiver noted to have fall risk factors

## 2022-12-09 NOTE — PROGRESS NOTES
Didier Mgauire -  [id number]    Through  writer confirmed patient's medications. Patient has a paper grocery bag full of his oral medications and blood sugar check supplies. Upon inquiry of refrigerated insulin patient did confirm that he had insulin in his refrigerator and provided a picture of the labels of insulin. This information was conveyed to Dr. Ila Rajan.

## 2022-12-09 NOTE — PROGRESS NOTES
[facility]  Office: [address], DO, Jono Steele, DO, Nahed Scott, DO, Sidney Pham Blood, DO, Ino Delacruz MD, Sierra Haas MD, Lisa Gallego MD, Darylene Sparks, MD,  Maxwell Hassan MD, Maykel Albright MD, Queta Dey, DO, Nilda Phillips MD,  Emerson Chavarria MD, Tu Prado MD, Elsa Daily DO, Ragini Sood MD, Hollis An MD, Peyton Garner, DO, Stark Mcardle, MD, Axel Ca MD, Loan Kirby MD, Hillis Kocher, MD, Leonie Kim, DO, Kandis Welsh MD, Juan Awad MD, Alfredo Douglas, Leann Jarrell, CNP, Jordan Chacko, CNP, Damien Stanley, CNP,  Mei Abraham, [facility], Linda Bowens, CNP, Tomas Boyce, CNP, Candee Dakin, CNP, Antonio Koo, CNP, Mayra Jolly, CNP, MARIAELENA TorresC, Jonel Seymour, CNS, Ashvin Eduardo, CNP, Fredy Bearden, [facility]    Progress Note    12/9/2022    12:47 PM    Name:   Shruthi Schwarz  MRN:     [de-identified]     [place]:      [de-identified]   Room:   2031/2031-01  IP Day:  1  Admit Date:  12/7/2022  9:29 PM    PCP:   Shana Ward MD  Code Status:  Full Code    Subjective:     C/C:   Chief Complaint   Patient presents with    Pacemaker Problem     Pt states 1 hour ago his pacemaker went off     Interval History Status: . Doing better  No arrhythmia or ICD firing after coming to hospital  Hemoglobin has been switched to oral by cardiology  Has been switched to Eliquis from Coumadin as echocardiogram is showing persistent mobile thrombus at apex of left ventricle, EF 25 to 30%  Has bag full of multiple diabetes medicines nurse confirmed the current medications with his wife  Brief History: Shruthi Schwarz is a 47 y.o. Non- / non  male who presents with Pacemaker Problem (Pt states 1 hour ago his pacemaker went off)   and is admitted to the hospital for the management of Recurrent ventricular tachycardia.      Patient has a history of ICD in place due to generalized ischemic myocardial dysfunction. He received ICD 4 years ago. Prior to yesterday, the device had never discharged. Patient says he was resting/relaxing when he felt his heart racing and then a subsequent shock. Day before yesterday, he says that he felt his heart racing for about 30 minutes, but he did not receive a shock at that time. He has a history of diabetic neuropathy, diabetes type 2, hyperlipidemia, anemia, chronic anticoagulation with warfarin due to history of clots, hypertension. He denies feeling ill. He says the last time he saw his cardiologist at [facility] was approximately 4 months ago. He denies any recent changes in his medications. He denies chest pain or shortness of breath. He came to the ED for evaluation after the AICD fired. While in ED, chest x-ray revealed mild cardiomegaly and no consolidation or CHF. Hemoglobin was 12.6, blood glucose 196, potassium 3.6. The AICD was interrogated while in the ED and it was found that the patient had approximately 30 episodes of V. tach in the last 24 hours, and it was verified that his device did indeed discharge. ED provider spoke with cardiology and an amiodarone infusion was started. He is being admitted for further management and work-up of recurrent ventricular tachycardia. Review of Systems:     Constitutional:  negative for chills, fevers, sweats  Respiratory:  negative for cough, dyspnea on exertion, shortness of breath, wheezing  Cardiovascular:  negative for chest pain, chest pressure/discomfort, lower extremity edema, palpitations  Gastrointestinal:  negative for abdominal pain, constipation, diarrhea, nausea, vomiting  Neurological:  negative for dizziness, headache    Medications: Allergies: Allergies   Allergen Reactions    Dapagliflozin Rash     Facial rash, and pt reported having pins/needles/burning sensation on left arm.    Facial rash, and pt reported having pins/needles/burning sensation on left arm. Current Meds:   Scheduled Meds:    amiodarone  200 mg Oral Daily    spironolactone  25 mg Oral Daily    [START ON 12/10/2022] canagliflozin  100 mg Oral QAM AC    glyBURIDE  10 mg Oral BID    insulin glargine  50 Units SubCUTAneous BID    [START ON 12/10/2022] furosemide  40 mg Oral Daily    aspirin  81 mg Oral Daily    atorvastatin  40 mg Oral Daily    fluticasone  1 spray Nasal Daily    gabapentin  300 mg Oral TID    lisinopril  5 mg Oral Daily    cetirizine  10 mg Oral Daily    metoprolol tartrate  25 mg Oral BID    sertraline  50 mg Oral Daily    sodium chloride flush  5-40 mL IntraVENous 2 times per day    insulin lispro  0-4 Units SubCUTAneous TID WC    insulin lispro  0-4 Units SubCUTAneous Nightly    pantoprazole  40 mg Oral QAM AC    ferrous sulfate  325 mg Oral Daily with breakfast    montelukast  10 mg Oral Nightly    apixaban  5 mg Oral BID     Continuous Infusions:    dextrose      sodium chloride       PRN Meds: glucose, dextrose bolus **OR** dextrose bolus, glucagon (rDNA), dextrose, sodium chloride flush, sodium chloride, potassium chloride **OR** potassium alternative oral replacement **OR** potassium chloride, magnesium sulfate, ondansetron **OR** ondansetron, acetaminophen **OR** acetaminophen, magnesium hydroxide, nitroGLYCERIN, albuterol sulfate HFA    Data:     Past Medical History:   has a past medical history of CAD (coronary artery disease), Diabetes mellitus ([facility] [address] Hyperlipidemia, Hypertension, and MI (myocardial infarction) ([facility] [address] Social History:   reports that he has been smoking. He has never used smokeless tobacco. He reports that he does not drink alcohol and does not use drugs.      Family History:   Family History   Problem Relation Age of Onset    Heart Attack Mother     Heart Attack Father        Vitals:  /73   Pulse 81   Temp 97.8 °F (36.6 °C) (Temporal)   Resp 16   Ht 5' 6\" (1.676 m)   Wt 212 lb (96.2 kg)   SpO2 97%   BMI 34.22 kg/m²   Temp (24hrs), Av.7 °F (36.5 °C), Min:97.1 °F (36.2 °C), Max:98 °F (36.7 °C)    Recent Labs     22  1140   POCGLU 170* 251* 213* 210*       I/O (24Hr):     Intake/Output Summary (Last 24 hours) at 2022 1247  Last data filed at 2022 0553  Gross per 24 hour   Intake 1128.45 ml   Output 200 ml   Net 928.45 ml       Labs:  Hematology:  Recent Labs     225   WBC 7.6 6.8   RBC 6.22* 6.10*   HGB 12.6* 12.3*   HCT 44.7 44.3   MCV 71.9* 72.6*   MCH 20.3* 20.2*   MCHC 28.2* 27.8*   RDW 19.8* 19.9*    287   MPV 10.7 10.5   INR  --  1.9     Chemistry:  Recent Labs     22  0013 22  0326     --   --  137  --    K 3.6*  --   --  3.5*  --      --   --  102  --    CO2 24  --   --  24  --    GLUCOSE 196*  --   --  121*  --    BUN 12  --   --  11  --    CREATININE 0.79  --   --  0.89  --    MG  --   --   --  2.2  --    ANIONGAP 11  --   --  11  --    LABGLOM >60  --   --  >60  --    CALCIUM 8.5*  --   --  8.6  --    PROBNP  --   --   --   --  141   TROPHS 16  22  --    MYOGLOBIN 101* 67 73*  --   --      Recent Labs     22  1214 22 22  1140   PROT 7.0  --   --   --   --   --   --    LABALBU 3.7  --   --   --   --   --   --    LABA1C 7.8*  --   --   --   --   --   --    AST 18  --   --   --   --   --   --    ALT 15  --   --   --   --   --   --    ALKPHOS 131*  --   --   --   --   --   --    BILITOT 0.4  --   --   --   --   --   --    CHOL 110  --   --   --   --   --   --    HDL 27*  --   --   --   --   --   --    LDLCHOLESTEROL 42  --   --   --   --   --   --    CHOLHDLRATIO 4.1  --   --   --   --   --   --    TRIG 205*  --   --   --   --   --   --    POCGLU  --  93 199* 170* 251* 213* 210*     ABG:No results found for: POCPH, PHART, PH, POCPCO2, ERT6CKX, PCO2, POCPO2, PO2ART, PO2, POCHCO3, BOM0BZM, HCO3, NBEA, PBEA, BEART, BE, THGBART, THB, SOI3KOK, SBWY9BID, X8DJHLAN, O2SAT, FIO2  Lab Results   Component Value Date/Time    SPECIAL NOT REPORTED 09/15/2019 12:20 PM     Lab Results   Component Value Date/Time    CULTURE (A) 09/15/2019 11:21 AM     POSITIVE Blood Culture Results called to and read back by: Rivera Lopez. FROM [place] [facility] AT 1471 ON 09/16/2019    CULTURE DIRECT GRAM STAIN FROM BOTTLE: GRAM NEGATIVE RODS 09/15/2019 11:21 AM    CULTURE ESCHERICHIA COLI (A) 09/15/2019 11:21 AM       Radiology:  XR CHEST PORTABLE    Result Date: 12/7/2022  No confluent airspace consolidation. Stable cardiomegaly without evidence of CHF. Echocardiogram  Left ventricle is mildly enlarged Global left ventricular systolic function  is severely reduced Estimated ejection fraction is 25-30 % . There is a mobile thrombus noted in the apex of the left ventricle. This was  also noted on echo done in May 2022. No significant valvular regurgitation or stenosis seen. No pericardial effusion.     Physical Examination:        General appearance:  alert, cooperative and no distress  Mental Status:  oriented to person, place and time and normal affect  Lungs:  clear to auscultation bilaterally, normal effort  Heart:  regular rate and rhythm, no murmur  Abdomen:  soft, nontender, nondistended, normal bowel sounds, no masses, hepatomegaly, splenomegaly  Extremities:  no edema, redness, tenderness in the calves  Skin:  no gross lesions, rashes, induration    Assessment:        Hospital Problems             Last Modified POA    * (Principal) Ventricular tachycardia 12/8/2022 Yes    Essential hypertension 12/8/2022 Yes    Hyperlipidemia 12/8/2022 Yes    LV (left ventricular) mural thrombus-mobile 12/9/2022 Yes    Obesity with body mass index 30 or greater 12/8/2022 Yes    DM (diabetes mellitus), type 2 ([facility] [address] 12/8/2022 Yes    AICD discharge 12/8/2022 Yes    Chronic anticoagulation 12/8/2022 Yes    Current every day nicotine vapor product user 12/8/2022 Yes    Overview Signed 12/8/2022  4:47 AM by DWIGHT Pal NP     Patient smokes hookah            Plan:        Coumadin has been switched to Eliquis by cardiology due to persistent apical LV thrombus which is mobile  Current diabetes medicines have been restarted  Discharge home if okay with cardiology    Rich Ag MD  12/9/2022  12:47 PM

## 2022-12-09 NOTE — PROGRESS NOTES
Dr. Maurilio Damon at bedside and updated patient and patient's spouse on the Medical Plan. Dr. Maurilio Damon spoke with them in Greek. Writer was informed that the patient told Dr. Maurilio Damon that he wanted to follow with Dr. Maurilio Damon versus the Cardiologist he saw previously (\"3 years ago\") at [facility]. Dr. Maurilio Damon gave the patient his business card and instructed them to call his office on Monday to arrange for a follow-up appointment.

## 2022-12-09 NOTE — PROGRESS NOTES
Yodit  -  [id number]    Through , writer reviewed discharge medication list with patient's spouse. Heart medications were highlighted in yellow. Medications that remained to be taken today were highlighted in blue. Patient's spouse verbalized understanding, via , the medication administration instructions. Writer did write on the patient's warfarin pill bottle a large red \"X\". This was done to facilitate understanding that the patient is to no longer take this medication.

## 2022-12-09 NOTE — PROGRESS NOTES
CLINICAL PHARMACY NOTE: MEDS TO BEDS    Total # of Prescriptions Filled: 8   The following medications were delivered to the patient:  Eliquis 5mg  Fluticasone prop 50 susp  Metoprolol tart 25mg  Amiodarone 200mg  Ventolin hfa 108 inhaler  Furosemide 20mg  Spironolactone 25mg  Nitroglycerin 0.4mg subsl    Additional Documentation:

## 2022-12-09 NOTE — PROGRESS NOTES
Patient left unit with all belongings. Meds to Bed were delivered to patient and patient left with those medications. Patient refused wheelchair and independently ambulated escorted by wife and brother.

## 2022-12-09 NOTE — PLAN OF CARE
Problem: Chronic Conditions and Co-morbidities  Goal: Patient's chronic conditions and co-morbidity symptoms are monitored and maintained or improved  12/9/2022 1532 by Bobby Nathan RN  Outcome: Adequate for Discharge  12/9/2022 1532 by Bobby Nathan RN  Outcome: Progressing  Flowsheets (Taken 12/9/2022 0800)  Care Plan - Patient's Chronic Conditions and Co-Morbidity Symptoms are Monitored and Maintained or Improved: Monitor and assess patient's chronic conditions and comorbid symptoms for stability, deterioration, or improvement  12/9/2022 0434 by Jennie Higuera RN  Flowsheets (Taken 12/8/2022 2000)  Care Plan - Patient's Chronic Conditions and Co-Morbidity Symptoms are Monitored and Maintained or Improved:   Monitor and assess patient's chronic conditions and comorbid symptoms for stability, deterioration, or improvement   Update acute care plan with appropriate goals if chronic or comorbid symptoms are exacerbated and prevent overall improvement and discharge   Collaborate with multidisciplinary team to address chronic and comorbid conditions and prevent exacerbation or deterioration     Problem: Discharge Planning  Goal: Discharge to home or other facility with appropriate resources  12/9/2022 1532 by Bobby Nathan RN  Outcome: Adequate for Discharge  12/9/2022 1532 by Bobby Nathan RN  Outcome: Progressing  Flowsheets (Taken 12/9/2022 0800)  Discharge to home or other facility with appropriate resources:   Identify barriers to discharge with patient and caregiver   Arrange for needed discharge resources and transportation as appropriate   Identify discharge learning needs (meds, wound care, etc)   Arrange for interpreters to assist at discharge as needed  12/9/2022 0434 by Jennie Higuera RN  Flowsheets (Taken 12/8/2022 2000)  Discharge to home or other facility with appropriate resources:   Identify barriers to discharge with patient and caregiver   Arrange for needed discharge resources and transportation as appropriate   Identify discharge learning needs (meds, wound care, etc)   Arrange for interpreters to assist at discharge as needed   Refer to discharge planning if patient needs post-hospital services based on physician order or complex needs related to functional status, cognitive ability or social support system     Problem: Pain  Goal: Verbalizes/displays adequate comfort level or baseline comfort level  12/9/2022 1532 by Garry Cruz RN  Outcome: Adequate for Discharge  12/9/2022 1532 by Garry Cruz RN  Outcome: Progressing  12/9/2022 0434 by Shirin Rodriguez RN  Flowsheets (Taken 12/9/2022 7786)  Verbalizes/displays adequate comfort level or baseline comfort level:   Encourage patient to monitor pain and request assistance   Assess pain using appropriate pain scale   Implement non-pharmacological measures as appropriate and evaluate response   Notify Licensed Independent Practitioner if interventions unsuccessful or patient reports new pain   Consider cultural and social influences on pain and pain management   Administer analgesics based on type and severity of pain and evaluate response     Problem: ABCDS Injury Assessment  Goal: Absence of physical injury  12/9/2022 1532 by Garry Cruz RN  Outcome: Adequate for Discharge  12/9/2022 1532 by Garry Cruz RN  Outcome: Progressing  Flowsheets (Taken 12/9/2022 0900)  Absence of Physical Injury: Implement safety measures based on patient assessment  12/9/2022 0434 by Shirin Rodriguez RN  Flowsheets (Taken 12/8/2022 2016)  Absence of Physical Injury: Implement safety measures based on patient assessment     Problem: Respiratory - Adult  Goal: Achieves optimal ventilation and oxygenation  12/9/2022 1532 by Garry Cruz RN  Outcome: Adequate for Discharge  12/9/2022 1532 by Garry Cruz RN  Outcome: Progressing  Flowsheets (Taken 12/9/2022 0800)  Achieves optimal ventilation and oxygenation:   Assess for changes in respiratory status   Assess for changes in mentation and behavior   Position to facilitate oxygenation and minimize respiratory effort   Oxygen supplementation based on oxygen saturation or arterial blood gases  12/9/2022 0434 by Sofya Zarco RN  Flowsheets (Taken 12/9/2022 9313)  Achieves optimal ventilation and oxygenation:   Assess for changes in respiratory status   Assess for changes in mentation and behavior   Position to facilitate oxygenation and minimize respiratory effort   Assess and instruct to report shortness of breath or any respiratory difficulty   Encourage broncho-pulmonary hygiene including cough, deep breathe, incentive spirometry   Initiate smoking cessation protocol as indicated     Problem: Cardiovascular - Adult  Goal: Maintains optimal cardiac output and hemodynamic stability  12/9/2022 1532 by Tomasa Morgan RN  Outcome: Adequate for Discharge  12/9/2022 1532 by Tomasa Morgan RN  Outcome: Progressing  Flowsheets (Taken 12/9/2022 0800)  Maintains optimal cardiac output and hemodynamic stability:   Monitor blood pressure and heart rate   Assess for signs of decreased cardiac output   Administer fluid and/or volume expanders as ordered  12/9/2022 0434 by Sofya Zarco RN  Flowsheets (Taken 12/8/2022 2000)  Maintains optimal cardiac output and hemodynamic stability:   Monitor blood pressure and heart rate   Monitor urine output and notify Licensed Independent Practitioner for values outside of normal range   Assess for signs of decreased cardiac output   Administer vasoactive medications as ordered   Administer fluid and/or volume expanders as ordered  Goal: Absence of cardiac dysrhythmias or at baseline  12/9/2022 1532 by Tomasa Morgan RN  Outcome: Adequate for Discharge  12/9/2022 1532 by Tomasa Morgan RN  Outcome: Progressing  Flowsheets (Taken 12/9/2022 0800)  Absence of cardiac dysrhythmias or at baseline: Monitor cardiac rate and rhythm  12/9/2022 0434 by Sofya Zarco RN  Flowsheets (Taken 12/8/2022 2000)  Absence of cardiac dysrhythmias or at baseline:   Monitor cardiac rate and rhythm   Assess for signs of decreased cardiac output   Administer antiarrhythmia medication and electrolyte replacement as ordered     Problem: Metabolic/Fluid and Electrolytes - Adult  Goal: Hemodynamic stability and optimal renal function maintained  12/9/2022 1532 by Isaias Encarnacion RN  Outcome: Adequate for Discharge  12/9/2022 1532 by Isaias Encarnacion RN  Outcome: Progressing  Flowsheets (Taken 12/9/2022 0800)  Hemodynamic stability and optimal renal function maintained:   Monitor labs and assess for signs and symptoms of volume excess or deficit   Monitor intake, output and patient weight  12/9/2022 0434 by Tarnu Wolfe RN  Flowsheets (Taken 12/8/2022 2000)  Hemodynamic stability and optimal renal function maintained:   Monitor labs and assess for signs and symptoms of volume excess or deficit   Monitor intake, output and patient weight   Monitor response to interventions for patient's volume status, including labs, urine output, blood pressure (other measures as available)   Monitor urine specific gravity, serum osmolarity and serum sodium as indicated or ordered   Instruct patient on fluid and nutrition restrictions as appropriate  Goal: Glucose maintained within prescribed range  12/9/2022 1532 by Isaias Encarnacion RN  Outcome: Adequate for Discharge  12/9/2022 1532 by Isaias Encarnacion RN  Outcome: Progressing  Flowsheets (Taken 12/9/2022 0800)  Glucose maintained within prescribed range:   Monitor blood glucose as ordered   Assess for signs and symptoms of hyperglycemia and hypoglycemia   Administer ordered medications to maintain glucose within target range   Assess barriers to adequate nutritional intake and initiate nutrition consult as needed  12/9/2022 0434 by Tarun Wolfe RN  Flowsheets (Taken 12/8/2022 2000)  Glucose maintained within prescribed range:   Monitor blood glucose as ordered   Assess for signs and symptoms of hyperglycemia and hypoglycemia   Administer ordered medications to maintain glucose within target range   Assess barriers to adequate nutritional intake and initiate nutrition consult as needed   Instruct patient on self management of diabetes and initiate consult as needed     Problem: Safety - Adult  Goal: Free from fall injury  12/9/2022 1532 by Mk Sabillon RN  Outcome: Adequate for Discharge  12/9/2022 1532 by Mk Sabillon RN  Outcome: Progressing  Flowsheets (Taken 12/9/2022 0900)  Free From Fall Injury: Instruct family/caregiver on patient safety  12/9/2022 0434 by Leopoldo Curry, RN  Flowsheets (Taken 12/9/2022 2946)  Free From Fall Injury:   Instruct family/caregiver on patient safety   Based on caregiver fall risk screen, instruct family/caregiver to ask for assistance with transferring infant if caregiver noted to have fall risk factors

## 2022-12-09 NOTE — DISCHARGE SUMMARY
[facility]  Office: [address], DO, Mel Marc, DO, Gloria Guido, DO, Randall García Blood, DO, Sabina King MD, Bassem Herbert MD, Leonardo Maya MD, Anastasia Bolanos MD,  Meir Snyder MD, Ciera Suarez MD, Chidi Perkins, DO, Unique Francois MD,  Gail Narvaez MD, Farzana Arnold MD, Andre Bright DO, Weston Easley MD, Marj Gutierrez MD, Rachele Pisano DO, Roly Taylor MD, Khalif Piper MD, Bladimir Norwood MD, Sowmya Mix MD, Santa Ybarra DO, Norbert Ji MD, Enriqueta Agarwal MD, Tobin Hillman, CNP,  Romelia Malik, CNP, Joaquina Sesay, CNP, Can Velazquez, CNP,  Sofía Bruce, [facility], Cathy Broderick, CNP, Cristian Purcell, CNP, Ishaan Morgan, CNP, Darian Garcia, CNP, Babatunde Arnett, CNP, Melani Hsu PA-C, Jessica Delgadillo, CNS, Parag Steven, [facility], [facility]    Discharge Summary     Patient ID: Elvis Patrick  :  1970   MRN: [id number]     ACCOUNT:  [de-identified]   Patient's PCP: Muriel Aguirre MD  Admit Date: 2022   Discharge Date: 2022     Length of Stay: 1  Code Status:  Full Code  Admitting Physician: Pretty Granado MD  Discharge Physician: Pretty Granado MD     Active Discharge Diagnoses:     Hospital Problem Lists:  Principal Problem:    Ventricular tachycardia  Active Problems:    Essential hypertension    Hyperlipidemia    LV (left ventricular) mural thrombus-mobile    Obesity with body mass index 30 or greater    DM (diabetes mellitus), type 2 (HCC)    AICD discharge    Chronic anticoagulation    Current every day nicotine vapor product user  Resolved Problems:    * No resolved hospital problems. *      Admission Condition:  poor     Discharged Condition: stable    Hospital Stay:   Admitting history: Elvis Patrick is a 47 y.o.  Non- / non  male who presents with Pacemaker Problem (Pt states 1 hour ago his pacemaker went off)   and is admitted to the hospital for the management of Recurrent ventricular tachycardia. Patient has a history of ICD in place due to generalized ischemic myocardial dysfunction. He received ICD 4 years ago. Prior to yesterday, the device had never discharged. Patient says he was resting/relaxing when he felt his heart racing and then a subsequent shock. Day before yesterday, he says that he felt his heart racing for about 30 minutes, but he did not receive a shock at that time. He has a history of diabetic neuropathy, diabetes type 2, hyperlipidemia, anemia, chronic anticoagulation with warfarin due to history of clots, hypertension. He denies feeling ill. He says the last time he saw his cardiologist at [facility] was approximately 4 months ago. He denies any recent changes in his medications. He denies chest pain or shortness of breath. He came to the ED for evaluation after the AICD fired. While in ED, chest x-ray revealed mild cardiomegaly and no consolidation or CHF. Hemoglobin was 12.6, blood glucose 196, potassium 3.6. The AICD was interrogated while in the ED and it was found that the patient had approximately 30 episodes of V. tach in the last 24 hours, and it was verified that his device did indeed discharge. ED provider spoke with cardiology and an amiodarone infusion was started. He is being admitted for further management and work-up of recurrent ventricular tachycardia.     Hospital Course:    Doing better  No arrhythmia or ICD firing after coming to hospital  Hemoglobin has been switched to oral by cardiology  Has been switched to Eliquis from Coumadin as echocardiogram is showing persistent mobile thrombus at apex of left ventricle, EF 25 to 30%  Has bag full of multiple diabetes medicines nurse confirmed the current medications with his wife    Plan:         Coumadin has been switched to Eliquis by cardiology due to persistent apical LV thrombus which is mobile  Current diabetes medicines have been restarted  Discharge home if okay with cardiology        Significant therapeutic interventions: See above notes    Significant Diagnostic Studies:   Labs / Micro:  CBC:   Lab Results   Component Value Date/Time    WBC 6.8 12/08/2022 04:55 AM    RBC 6.10 12/08/2022 04:55 AM    HGB 12.3 12/08/2022 04:55 AM    HCT 44.3 12/08/2022 04:55 AM    MCV 72.6 12/08/2022 04:55 AM    MCH 20.2 12/08/2022 04:55 AM    MCHC 27.8 12/08/2022 04:55 AM    RDW 19.9 12/08/2022 04:55 AM     12/08/2022 04:55 AM     BMP:    Lab Results   Component Value Date/Time    GLUCOSE 121 12/08/2022 04:55 AM     12/08/2022 04:55 AM    K 3.5 12/08/2022 04:55 AM     12/08/2022 04:55 AM    CO2 24 12/08/2022 04:55 AM    ANIONGAP 11 12/08/2022 04:55 AM    BUN 11 12/08/2022 04:55 AM    CREATININE 0.89 12/08/2022 04:55 AM    BUNCRER 12 12/08/2022 04:55 AM    CALCIUM 8.6 12/08/2022 04:55 AM    LABGLOM >60 12/08/2022 04:55 AM    GFRAA >60 03/22/2022 02:35 AM    GFR      03/22/2022 02:35 AM     HFP:    Lab Results   Component Value Date/Time    PROT 7.0 12/08/2022 04:55 AM     CMP:    Lab Results   Component Value Date/Time    GLUCOSE 121 12/08/2022 04:55 AM     12/08/2022 04:55 AM    K 3.5 12/08/2022 04:55 AM     12/08/2022 04:55 AM    CO2 24 12/08/2022 04:55 AM    BUN 11 12/08/2022 04:55 AM    CREATININE 0.89 12/08/2022 04:55 AM    ANIONGAP 11 12/08/2022 04:55 AM    ALKPHOS 131 12/08/2022 04:55 AM    ALT 15 12/08/2022 04:55 AM    AST 18 12/08/2022 04:55 AM    BILITOT 0.4 12/08/2022 04:55 AM    LABALBU 3.7 12/08/2022 04:55 AM    LABGLOM >60 12/08/2022 04:55 AM    GFRAA >60 03/22/2022 02:35 AM    GFR      03/22/2022 02:35 AM    PROT 7.0 12/08/2022 04:55 AM    CALCIUM 8.6 12/08/2022 04:55 AM     PT/INR:    Lab Results   Component Value Date/Time    PROTIME 21.4 12/08/2022 04:55 AM    INR 1.9 12/08/2022 04:55 AM     PTT:   Lab Results   Component Value Date/Time    APTT 25.2 03/22/2022 02:35 AM     FLP:    Lab Results   Component Value Date/Time    CHOL 110 12/08/2022 04:55 AM    TRIG 205 12/08/2022 04:55 AM    HDL 27 12/08/2022 04:55 AM     U/A:    Lab Results   Component Value Date/Time    COLORU Yellow 03/22/2022 03:00 AM    TURBIDITY Clear 03/22/2022 03:00 AM    SPECGRAV 1.023 03/22/2022 03:00 AM    HGBUR NEGATIVE 03/22/2022 03:00 AM    PHUR 5.5 03/22/2022 03:00 AM    PROTEINU NEGATIVE 03/22/2022 03:00 AM    GLUCOSEU NEGATIVE 03/22/2022 03:00 AM    KETUA NEGATIVE 03/22/2022 03:00 AM    BILIRUBINUR NEGATIVE 03/22/2022 03:00 AM    UROBILINOGEN Normal 03/22/2022 03:00 AM    NITRU NEGATIVE 03/22/2022 03:00 AM    LEUKOCYTESUR NEGATIVE 03/22/2022 03:00 AM     TSH:  No results found for: TSH     Radiology:  XR CHEST PORTABLE    Result Date: 12/7/2022  No confluent airspace consolidation. Stable cardiomegaly without evidence of CHF. Consultations:    Consults:     Final Specialist Recommendations/Findings:   IP CONSULT TO CARDIOLOGY  IP CONSULT TO HOSPITALIST  PHARMACY TO DOSE WARFARIN      The patient was seen and examined on day of discharge and this discharge summary is in conjunction with any daily progress note from day of discharge. Discharge plan:     Disposition: Home    Physician Follow Up:   PCP within 1 week       Requiring Further Evaluation/Follow Up POST HOSPITALIZATION/Incidental Findings:  Follow-up with cardiology as scheduled    Diet: cardiac diet and diabetic diet    Activity: As tolerated    Instructions to Patient: Monitor blood sugar at home and use diabetes medicines as directed    Discharge Medications:      Medication List        START taking these medications      albuterol sulfate  (90 Base) MCG/ACT inhaler  Commonly known as: PROVENTIL;VENTOLIN;PROAIR  Inhale 2 puffs into the lungs every 4 hours as needed for Wheezing     amiodarone 200 MG tablet  Commonly known as: CORDARONE  Take 1 tablet by mouth daily  Start taking on: December 10, 2022     apixaban 5 MG Tabs tablet  Commonly known as: ELIQUIS  Take 1 tablet by mouth 2 times daily     nitroGLYCERIN 0.4 MG SL tablet  Commonly known as: NITROSTAT  Place 1 tablet under the tongue every 5 minutes as needed for Chest pain up to max of 3 total doses. If no relief after 1 dose, call 911.     spironolactone 25 MG tablet  Commonly known as: ALDACTONE  Take 1 tablet by mouth daily  Start taking on: December 10, 2022            CHANGE how you take these medications      furosemide 20 MG tablet  Commonly known as: LASIX  Take 1 tablet by mouth daily  What changed:   medication strength  how much to take            CONTINUE taking these medications      alfuzosin 10 MG extended release tablet  Commonly known as: UROXATRAL     aspirin 81 MG chewable tablet     atorvastatin 40 MG tablet  Commonly known as: LIPITOR     Claritin 10 MG tablet  Generic drug: loratadine     ferrous sulfate 325 (65 Fe) MG tablet  Commonly known as: IRON 325     fluticasone 50 MCG/ACT nasal spray  Commonly known as: FLONASE  1 spray by Nasal route daily     glyBURIDE 5 MG tablet  Commonly known as: DIABETA     insulin glargine 100 UNIT/ML injection pen  Commonly known as: LANTUS;BASAGLAR     Invokana 100 MG Tabs tablet  Generic drug: canagliflozin     Liraglutide 18 MG/3ML Sopn SC injection  Commonly known as: VICTOZA     lisinopril 2.5 MG tablet  Commonly known as: PRINIVIL;ZESTRIL  Notes to patient: HOLD if systolic blood pressure (the top number) is less than 110. metoprolol tartrate 25 MG tablet  Commonly known as: LOPRESSOR  Take 1 tablet by mouth 2 times daily  Notes to patient: HOLD if systolic blood pressure (the top number) is less than 110.      metroNIDAZOLE 0.75 % gel  Commonly known as: METROGEL     montelukast 10 MG tablet  Commonly known as: SINGULAIR     omeprazole 20 MG delayed release capsule  Commonly known as: PRILOSEC     Vitamin D3 125 MCG (5000 UT) Tabs  Take 1 tablet by mouth Daily            STOP taking these medications      SITagliptin 100 MG tablet  Commonly known as: JANUVIA     warfarin 4 MG tablet  Commonly known as: COUMADIN               Where to Get Your Medications        These medications were sent to [facility] [place] [address] [facility], [address] [place] [address] [phone] - F [phone]  [facility] [address]      Phone: [phone]   albuterol sulfate  (90 Base) MCG/ACT inhaler  amiodarone 200 MG tablet  apixaban 5 MG Tabs tablet  fluticasone 50 MCG/ACT nasal spray  furosemide 20 MG tablet  metoprolol tartrate 25 MG tablet  nitroGLYCERIN 0.4 MG SL tablet  spironolactone 25 MG tablet  Vitamin D3 125 MCG (5000 UT) Tabs         No discharge procedures on file. Time Spent on discharge is  31 mins in patient examination, evaluation, counseling as well as medication reconciliation, prescriptions for required medications, discharge plan and follow up. Electronically signed by   Laura Henriquez MD  12/9/2022  4:39 PM      Thank you Dr. Artem Flores MD for the opportunity to be involved in this patient's care.

## 2022-12-09 NOTE — CARE COORDINATION
12/09/22 1111   Service Assessment   Patient Orientation Alert and Oriented   Cognition Alert   History Provided By Patient  ( Tamazight)   Primary Caregiver Spouse   Support Systems Spouse/Significant Other;Children;Family Members   PCP Verified by CM Yes   Last Visit to PCP Within last 3 months   Prior Functional Level Independent in ADLs/IADLs   Current Functional Level Independent in ADLs/IADLs   Can patient return to prior living arrangement Yes   Ability to make needs known: Good   Family able to assist with home care needs: Yes   Financial Resources Medicaid  ([employer])   Social/Functional History   Lives With Spouse  (3 kids)   Type of [address] One level   Receives Help From Family   ADL Assistance Independent   Homemaking Assistance Independent   Ambulation Assistance Independent   Transfer Assistance Independent   Active  Yes   Mode of Transportation Car   Occupation Unemployed   Discharge Planning   Type of [address] Prior To Admission None   Potential Assistance Needed Prescription Assistance   DME Ordered? No   Patient expects to be discharged to: House   One/Two Story Residence One story   History of falls? 0   Services At/After Discharge   Services At/After Discharge None   Confirm Follow Up Transport Family   Condition of Participation: Discharge Planning   The Patient and/or Patient Representative was provided with a Choice of Provider? Patient   The Patient and/Or Patient Representative agree with the Discharge Plan? Yes   Freedom of Choice list was provided with basic dialogue that supports the patient's individualized plan of care/goals, treatment preferences, and shares the quality data associated with the providers? Yes       Pt primary language is Tamazight. Assessment completed with  services [facility] #[id number]. Pt is a +o, cooperative.  He is independent lives with wife and 3 kids. No DME. His pcp and rx are Urdu speaking. He has been on Coumadin and cardio will be switching him to Eliquis. Pt was going to Coumadin Clinic at [facility]. Discussed Eliquis and importance of taking 2xday and compliance. Informed he will no longer be on Coumadin. Discussed cost $2 per month agrees. There is a rx for Eliquis at [facility] -spoke with RX they will deliver to room today.      [address]

## 2022-12-09 NOTE — PROGRESS NOTES
Angeli Espinal -  [id number]    Through the  writer explained to patient her role, the anticipated medical plan of care, the goal of nursing care, and which physicians will be rounding at patient's bedside. Writer confirmed orientation to setting and the reason for patient's admission. Through  Pratima Luis was assured that patient was in no pain and appropriately oriented.

## 2022-12-10 PROBLEM — L03.113 CELLULITIS OF RIGHT UPPER EXTREMITY: Status: ACTIVE | Noted: 2022-12-10

## 2022-12-10 PROBLEM — R55 SYNCOPAL VERTIGO: Status: ACTIVE | Noted: 2022-12-10

## 2022-12-10 PROBLEM — R42 SYNCOPAL VERTIGO: Status: ACTIVE | Noted: 2022-12-10

## 2022-12-10 PROBLEM — I95.2 HYPOTENSION DUE TO MEDICATION: Status: ACTIVE | Noted: 2022-12-10

## 2022-12-10 PROBLEM — I50.22 CHRONIC SYSTOLIC HEART FAILURE (HCC): Status: ACTIVE | Noted: 2022-12-10

## 2022-12-10 PROBLEM — Z79.4 TYPE 2 DIABETES MELLITUS WITH HYPERGLYCEMIA, WITH LONG-TERM CURRENT USE OF INSULIN (HCC): Status: ACTIVE | Noted: 2017-11-16

## 2022-12-10 PROBLEM — E11.65 TYPE 2 DIABETES MELLITUS WITH HYPERGLYCEMIA, WITH LONG-TERM CURRENT USE OF INSULIN (HCC): Status: ACTIVE | Noted: 2017-11-16

## 2022-12-10 LAB
ANION GAP SERPL CALCULATED.3IONS-SCNC: 11 MMOL/L (ref 9–17)
BUN BLDV-MCNC: 12 MG/DL (ref 6–20)
BUN/CREAT BLD: 13 (ref 9–20)
CALCIUM SERPL-MCNC: 8.7 MG/DL (ref 8.6–10.4)
CHLORIDE BLD-SCNC: 102 MMOL/L (ref 98–107)
CO2: 24 MMOL/L (ref 20–31)
CREAT SERPL-MCNC: 0.95 MG/DL (ref 0.7–1.2)
EKG ATRIAL RATE: 76 BPM
EKG P AXIS: 36 DEGREES
EKG P-R INTERVAL: 174 MS
EKG Q-T INTERVAL: 370 MS
EKG QRS DURATION: 88 MS
EKG QTC CALCULATION (BAZETT): 416 MS
EKG R AXIS: -19 DEGREES
EKG T AXIS: 90 DEGREES
EKG VENTRICULAR RATE: 76 BPM
GFR SERPL CREATININE-BSD FRML MDRD: >60 ML/MIN/1.73M2
GLUCOSE BLD-MCNC: 117 MG/DL (ref 75–110)
GLUCOSE BLD-MCNC: 254 MG/DL (ref 75–110)
GLUCOSE BLD-MCNC: 57 MG/DL (ref 75–110)
GLUCOSE BLD-MCNC: 62 MG/DL (ref 70–99)
GLUCOSE BLD-MCNC: 70 MG/DL (ref 75–110)
GLUCOSE BLD-MCNC: 86 MG/DL (ref 75–110)
POTASSIUM SERPL-SCNC: 4 MMOL/L (ref 3.7–5.3)
SODIUM BLD-SCNC: 137 MMOL/L (ref 135–144)

## 2022-12-10 PROCEDURE — 94761 N-INVAS EAR/PLS OXIMETRY MLT: CPT

## 2022-12-10 PROCEDURE — 36415 COLL VENOUS BLD VENIPUNCTURE: CPT

## 2022-12-10 PROCEDURE — 2580000003 HC RX 258: Performed by: EMERGENCY MEDICINE

## 2022-12-10 PROCEDURE — 6370000000 HC RX 637 (ALT 250 FOR IP): Performed by: NURSE PRACTITIONER

## 2022-12-10 PROCEDURE — 80048 BASIC METABOLIC PNL TOTAL CA: CPT

## 2022-12-10 PROCEDURE — G0378 HOSPITAL OBSERVATION PER HR: HCPCS

## 2022-12-10 PROCEDURE — 99220 PR INITIAL OBSERVATION CARE/DAY 70 MINUTES: CPT | Performed by: INTERNAL MEDICINE

## 2022-12-10 PROCEDURE — 2500000003 HC RX 250 WO HCPCS: Performed by: EMERGENCY MEDICINE

## 2022-12-10 PROCEDURE — 2580000003 HC RX 258: Performed by: NURSE PRACTITIONER

## 2022-12-10 PROCEDURE — 82947 ASSAY GLUCOSE BLOOD QUANT: CPT

## 2022-12-10 PROCEDURE — 6370000000 HC RX 637 (ALT 250 FOR IP): Performed by: INTERNAL MEDICINE

## 2022-12-10 PROCEDURE — 96361 HYDRATE IV INFUSION ADD-ON: CPT

## 2022-12-10 PROCEDURE — 93010 ELECTROCARDIOGRAM REPORT: CPT | Performed by: INTERNAL MEDICINE

## 2022-12-10 PROCEDURE — 96365 THER/PROPH/DIAG IV INF INIT: CPT

## 2022-12-10 RX ORDER — ONDANSETRON 2 MG/ML
4 INJECTION INTRAMUSCULAR; INTRAVENOUS EVERY 6 HOURS PRN
Status: DISCONTINUED | OUTPATIENT
Start: 2022-12-10 | End: 2022-12-11 | Stop reason: HOSPADM

## 2022-12-10 RX ORDER — ASPIRIN 81 MG/1
81 TABLET, CHEWABLE ORAL DAILY
Status: DISCONTINUED | OUTPATIENT
Start: 2022-12-10 | End: 2022-12-11 | Stop reason: HOSPADM

## 2022-12-10 RX ORDER — INSULIN GLARGINE 100 [IU]/ML
50 INJECTION, SOLUTION SUBCUTANEOUS DAILY
Status: DISCONTINUED | OUTPATIENT
Start: 2022-12-11 | End: 2022-12-11 | Stop reason: HOSPADM

## 2022-12-10 RX ORDER — MONTELUKAST SODIUM 10 MG/1
10 TABLET ORAL NIGHTLY
Status: DISCONTINUED | OUTPATIENT
Start: 2022-12-10 | End: 2022-12-10

## 2022-12-10 RX ORDER — MAGNESIUM SULFATE 1 G/100ML
1000 INJECTION INTRAVENOUS PRN
Status: DISCONTINUED | OUTPATIENT
Start: 2022-12-10 | End: 2022-12-11 | Stop reason: HOSPADM

## 2022-12-10 RX ORDER — ONDANSETRON 4 MG/1
4 TABLET, ORALLY DISINTEGRATING ORAL EVERY 8 HOURS PRN
Status: DISCONTINUED | OUTPATIENT
Start: 2022-12-10 | End: 2022-12-11 | Stop reason: HOSPADM

## 2022-12-10 RX ORDER — SPIRONOLACTONE 25 MG/1
25 TABLET ORAL DAILY
Status: DISCONTINUED | OUTPATIENT
Start: 2022-12-10 | End: 2022-12-11 | Stop reason: HOSPADM

## 2022-12-10 RX ORDER — TAMSULOSIN HYDROCHLORIDE 0.4 MG/1
0.4 CAPSULE ORAL DAILY
Status: DISCONTINUED | OUTPATIENT
Start: 2022-12-10 | End: 2022-12-11 | Stop reason: HOSPADM

## 2022-12-10 RX ORDER — POLYETHYLENE GLYCOL 3350 17 G/17G
17 POWDER, FOR SOLUTION ORAL DAILY PRN
Status: DISCONTINUED | OUTPATIENT
Start: 2022-12-10 | End: 2022-12-11 | Stop reason: HOSPADM

## 2022-12-10 RX ORDER — SULFAMETHOXAZOLE AND TRIMETHOPRIM 800; 160 MG/1; MG/1
1 TABLET ORAL EVERY 12 HOURS SCHEDULED
Status: DISCONTINUED | OUTPATIENT
Start: 2022-12-10 | End: 2022-12-11 | Stop reason: HOSPADM

## 2022-12-10 RX ORDER — SODIUM CHLORIDE 0.9 % (FLUSH) 0.9 %
10 SYRINGE (ML) INJECTION PRN
Status: DISCONTINUED | OUTPATIENT
Start: 2022-12-10 | End: 2022-12-11 | Stop reason: HOSPADM

## 2022-12-10 RX ORDER — AMIODARONE HYDROCHLORIDE 200 MG/1
200 TABLET ORAL DAILY
Status: DISCONTINUED | OUTPATIENT
Start: 2022-12-10 | End: 2022-12-11 | Stop reason: HOSPADM

## 2022-12-10 RX ORDER — INSULIN GLARGINE 100 [IU]/ML
50 INJECTION, SOLUTION SUBCUTANEOUS 2 TIMES DAILY
Status: DISCONTINUED | OUTPATIENT
Start: 2022-12-10 | End: 2022-12-10

## 2022-12-10 RX ORDER — LANOLIN ALCOHOL/MO/W.PET/CERES
325 CREAM (GRAM) TOPICAL
Status: DISCONTINUED | OUTPATIENT
Start: 2022-12-10 | End: 2022-12-11 | Stop reason: HOSPADM

## 2022-12-10 RX ORDER — POTASSIUM CHLORIDE 20 MEQ/1
40 TABLET, EXTENDED RELEASE ORAL PRN
Status: DISCONTINUED | OUTPATIENT
Start: 2022-12-10 | End: 2022-12-11 | Stop reason: HOSPADM

## 2022-12-10 RX ORDER — ACETAMINOPHEN 650 MG/1
650 SUPPOSITORY RECTAL EVERY 6 HOURS PRN
Status: DISCONTINUED | OUTPATIENT
Start: 2022-12-10 | End: 2022-12-11 | Stop reason: HOSPADM

## 2022-12-10 RX ORDER — DEXTROSE MONOHYDRATE 100 MG/ML
INJECTION, SOLUTION INTRAVENOUS CONTINUOUS PRN
Status: DISCONTINUED | OUTPATIENT
Start: 2022-12-10 | End: 2022-12-11 | Stop reason: HOSPADM

## 2022-12-10 RX ORDER — POTASSIUM CHLORIDE 7.45 MG/ML
10 INJECTION INTRAVENOUS PRN
Status: DISCONTINUED | OUTPATIENT
Start: 2022-12-10 | End: 2022-12-11 | Stop reason: HOSPADM

## 2022-12-10 RX ORDER — FUROSEMIDE 20 MG/1
20 TABLET ORAL DAILY
Status: DISCONTINUED | OUTPATIENT
Start: 2022-12-10 | End: 2022-12-11 | Stop reason: HOSPADM

## 2022-12-10 RX ORDER — GLIPIZIDE 10 MG/1
10 TABLET ORAL
Status: DISCONTINUED | OUTPATIENT
Start: 2022-12-10 | End: 2022-12-11 | Stop reason: HOSPADM

## 2022-12-10 RX ORDER — ACETAMINOPHEN 325 MG/1
650 TABLET ORAL EVERY 6 HOURS PRN
Status: DISCONTINUED | OUTPATIENT
Start: 2022-12-10 | End: 2022-12-11 | Stop reason: HOSPADM

## 2022-12-10 RX ORDER — ATORVASTATIN CALCIUM 40 MG/1
40 TABLET, FILM COATED ORAL DAILY
Status: DISCONTINUED | OUTPATIENT
Start: 2022-12-10 | End: 2022-12-11 | Stop reason: HOSPADM

## 2022-12-10 RX ORDER — CEPHALEXIN 500 MG/1
500 CAPSULE ORAL EVERY 6 HOURS SCHEDULED
Status: DISCONTINUED | OUTPATIENT
Start: 2022-12-10 | End: 2022-12-10

## 2022-12-10 RX ORDER — SODIUM CHLORIDE 0.9 % (FLUSH) 0.9 %
5-40 SYRINGE (ML) INJECTION EVERY 12 HOURS SCHEDULED
Status: DISCONTINUED | OUTPATIENT
Start: 2022-12-10 | End: 2022-12-11 | Stop reason: HOSPADM

## 2022-12-10 RX ORDER — LISINOPRIL 2.5 MG/1
2.5 TABLET ORAL DAILY
Status: DISCONTINUED | OUTPATIENT
Start: 2022-12-10 | End: 2022-12-11 | Stop reason: HOSPADM

## 2022-12-10 RX ORDER — GLYBURIDE 5 MG/1
10 TABLET ORAL 2 TIMES DAILY WITH MEALS
Status: DISCONTINUED | OUTPATIENT
Start: 2022-12-10 | End: 2022-12-10 | Stop reason: CLARIF

## 2022-12-10 RX ORDER — PANTOPRAZOLE SODIUM 40 MG/1
40 TABLET, DELAYED RELEASE ORAL
Status: DISCONTINUED | OUTPATIENT
Start: 2022-12-10 | End: 2022-12-11 | Stop reason: HOSPADM

## 2022-12-10 RX ORDER — CETIRIZINE HYDROCHLORIDE 10 MG/1
10 TABLET ORAL DAILY
Status: DISCONTINUED | OUTPATIENT
Start: 2022-12-10 | End: 2022-12-10

## 2022-12-10 RX ORDER — SODIUM CHLORIDE 9 MG/ML
INJECTION, SOLUTION INTRAVENOUS PRN
Status: DISCONTINUED | OUTPATIENT
Start: 2022-12-10 | End: 2022-12-11 | Stop reason: HOSPADM

## 2022-12-10 RX ORDER — FLUTICASONE PROPIONATE 50 MCG
1 SPRAY, SUSPENSION (ML) NASAL DAILY
Status: DISCONTINUED | OUTPATIENT
Start: 2022-12-10 | End: 2022-12-11 | Stop reason: HOSPADM

## 2022-12-10 RX ADMIN — CLINDAMYCIN IN 5 PERCENT DEXTROSE 600 MG: 12 INJECTION, SOLUTION INTRAVENOUS at 00:21

## 2022-12-10 RX ADMIN — SODIUM CHLORIDE, PRESERVATIVE FREE 10 ML: 5 INJECTION INTRAVENOUS at 09:01

## 2022-12-10 RX ADMIN — PANTOPRAZOLE SODIUM 40 MG: 40 TABLET, DELAYED RELEASE ORAL at 06:30

## 2022-12-10 RX ADMIN — APIXABAN 5 MG: 5 TABLET, FILM COATED ORAL at 20:24

## 2022-12-10 RX ADMIN — TAMSULOSIN HYDROCHLORIDE 0.4 MG: 0.4 CAPSULE ORAL at 14:28

## 2022-12-10 RX ADMIN — AMIODARONE HYDROCHLORIDE 200 MG: 200 TABLET ORAL at 09:01

## 2022-12-10 RX ADMIN — FUROSEMIDE 20 MG: 20 TABLET ORAL at 14:28

## 2022-12-10 RX ADMIN — CETIRIZINE HYDROCHLORIDE 10 MG: 10 TABLET, FILM COATED ORAL at 09:00

## 2022-12-10 RX ADMIN — SULFAMETHOXAZOLE AND TRIMETHOPRIM 1 TABLET: 800; 160 TABLET ORAL at 20:23

## 2022-12-10 RX ADMIN — APIXABAN 5 MG: 5 TABLET, FILM COATED ORAL at 09:00

## 2022-12-10 RX ADMIN — FERROUS SULFATE TAB EC 325 MG (65 MG FE EQUIVALENT) 325 MG: 325 (65 FE) TABLET DELAYED RESPONSE at 09:01

## 2022-12-10 RX ADMIN — SODIUM CHLORIDE 500 ML: 9 INJECTION, SOLUTION INTRAVENOUS at 00:18

## 2022-12-10 RX ADMIN — ATORVASTATIN CALCIUM 40 MG: 40 TABLET, FILM COATED ORAL at 09:00

## 2022-12-10 RX ADMIN — SODIUM CHLORIDE, PRESERVATIVE FREE 10 ML: 5 INJECTION INTRAVENOUS at 20:24

## 2022-12-10 RX ADMIN — ASPIRIN 81 MG CHEWABLE TABLET 81 MG: 81 TABLET CHEWABLE at 09:00

## 2022-12-10 RX ADMIN — METOPROLOL TARTRATE 25 MG: 25 TABLET, FILM COATED ORAL at 10:40

## 2022-12-10 RX ADMIN — CEPHALEXIN 500 MG: 500 CAPSULE ORAL at 06:30

## 2022-12-10 RX ADMIN — CHOLECALCIFEROL TAB 125 MCG (5000 UNIT) 5000 UNITS: 125 TAB at 06:30

## 2022-12-10 ASSESSMENT — ENCOUNTER SYMPTOMS
DIARRHEA: 0
EYES NEGATIVE: 1
SHORTNESS OF BREATH: 0
SINUS PAIN: 0
VOMITING: 0
CHEST TIGHTNESS: 0
APNEA: 0
COLOR CHANGE: 1
ABDOMINAL DISTENTION: 0
CONSTIPATION: 0

## 2022-12-10 ASSESSMENT — PAIN SCALES - GENERAL
PAINLEVEL_OUTOF10: 0
PAINLEVEL_OUTOF10: 0

## 2022-12-10 NOTE — CARE COORDINATION
12/09/22 1111   Service Assessment   Patient Orientation Alert and Oriented   Cognition Alert   History Provided By Patient  ( Georgian)   Primary Caregiver Spouse   Support Systems Spouse/Significant Other;Children;Family Members   PCP Verified by CM Yes   Last Visit to PCP Within last 3 months   Prior Functional Level Independent in ADLs/IADLs   Current Functional Level Independent in ADLs/IADLs   Can patient return to prior living arrangement Yes   Ability to make needs known: Good   Family able to assist with home care needs: Yes   Financial Resources Medicaid  ([employer])   Social/Functional History   Lives With Spouse  (3 kids)   Type of [address] One level   Receives Help From Family   ADL Assistance Independent   Homemaking Assistance Independent   Ambulation Assistance Independent   Transfer Assistance Independent   Active  Yes   Mode of Transportation Car   Occupation Unemployed   Discharge Planning   Type of [address] Prior To Admission None   Potential Assistance Needed Prescription Assistance   DME Ordered? No   Patient expects to be discharged to: House   One/Two Story Residence One story   History of falls? 0   Services At/After Discharge   Services At/After Discharge None   Confirm Follow Up Transport Family   Condition of Participation: Discharge Planning   The Patient and/or Patient Representative was provided with a Choice of Provider? Patient   The Patient and/Or Patient Representative agree with the Discharge Plan? Yes   Freedom of Choice list was provided with basic dialogue that supports the patient's individualized plan of care/goals, treatment preferences, and shares the quality data associated with the providers? Yes      CHART REVIEW PT WAS JUST DISCHARGED 12/9-ASSESSED 12/9    Pt primary language is Georgian.        He is independent lives with wife and 3 kids. No DME. His pcp and rx are Maori speaking. [facility]     Will need to make sure pt understands medication regimen with  services.      Pt ws transitioned to Eliquis from Coumadin 12/7-12/9 admission

## 2022-12-10 NOTE — PROGRESS NOTES
Patient arrived to unit from ED around 0400 with a paper bag full of home medications. Admission questions completed with help from Tajik  services. Patient is a poor historian; unsure of his home glucose management, pacemaker info, or other medical history. Patient's POCT BG at 0530 was 57; patient asymptomatic and A&O. Rosa NP notified; crackers and 4oz OJ given. Recheck after 15 minutes was 86.

## 2022-12-10 NOTE — ED PROVIDER NOTES
EMERGENCY DEPARTMENT ENCOUNTER    Pt Name: Riya Cook  MRN: [[id number]]  [place] 1970  Date of evaluation: 12/9/22  CHIEF COMPLAINT       Chief Complaint   Patient presents with    Dizziness     HISTORY OF PRESENT ILLNESS   78-year-old male presents emergency room for syncopal episode. Patient was at home tonight when he became lightheaded and passed out. Patient was just discharged here from the hospital yesterday after episode of V. tach and being shocked by his defibrillator. Patient had some medication changes and went home. Dizziness started suddenly tonight. Patient also comments on some discomfort to his right arm he has noticed to have redness and swelling as well as warmth to the arm where his IV had been located during his recent hospitalization. Information was obtained using  device. REVIEW OF SYSTEMS     Review of Systems   Constitutional:  Negative for activity change, chills and diaphoresis. HENT:  Negative for congestion, sinus pain and tinnitus. Eyes: Negative. Respiratory:  Negative for apnea, chest tightness and shortness of breath. Gastrointestinal:  Negative for abdominal distention, constipation, diarrhea and vomiting. Genitourinary:  Negative for difficulty urinating and frequency. Musculoskeletal:  Negative for arthralgias and myalgias. Skin:  Positive for color change. Negative for rash. Neurological:  Positive for dizziness and syncope. Hematological: Negative. Psychiatric/Behavioral: Negative.        PASTMEDICAL HISTORY     Past Medical History:   Diagnosis Date    CAD (coronary artery disease)     Diabetes mellitus ([facility]     Hyperlipidemia     Hypertension     MI (myocardial infarction) ([facility]      Past Problem List  Patient Active Problem List   Diagnosis Code    Ventricular tachycardia I47.20    Impotence of organic origin N52.9    Acquired trigger finger M65.30    Acute embolism and thrombosis of unspecified vein I82.90    Diabetic neuropathy ([facility] E11.40    Essential hypertension I10    Generalized ischemic myocardial dysfunction I25.5    Hyperlipidemia E78.5    Hypogonadism in male E29.1    NSTEMI (non-ST elevated myocardial infarction) ([facility] I21.4    Neuropathy due to secondary diabetes mellitus ([facility]) E13.40    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    Obesity with body mass index 30 or greater E66.9    Severe sepsis ([facility]) A41.9, R65.20    Slowing of urinary stream R39.198    Type 2 diabetes mellitus with hyperglycemia, with long-term current use of insulin ([facility]) E11.65, Z79.4    Severe recurrent major depression without psychotic features ([facility] F33.2    AICD discharge Z45.02    Chronic anticoagulation Z79.01    Current every day nicotine vapor product user Z72.0    Syncope and collapse R55    Chronic systolic heart failure ([facility]) I50.22    Cellulitis of right upper extremity L03.113    Hypotension due to medication I95.2     SURGICAL HISTORY       Past Surgical History:   Procedure Laterality Date    CHOLECYSTECTOMY      MULTIPLE TOOTH EXTRACTIONS       CURRENT MEDICATIONS       Discharge Medication List as of 12/11/2022  1:24 PM        CONTINUE these medications which have NOT CHANGED    Details   amiodarone (CORDARONE) 200 MG tablet Take 1 tablet by mouth daily, Disp-30 tablet, R-1Normal      apixaban (ELIQUIS) 5 MG TABS tablet Take 1 tablet by mouth 2 times daily, Disp-60 tablet, R-1Normal      fluticasone (FLONASE) 50 MCG/ACT nasal spray 1 spray by Nasal route daily, Disp-16 g, R-3Normal      metoprolol tartrate (LOPRESSOR) 25 MG tablet Take 1 tablet by mouth 2 times daily, Disp-60 tablet, R-3Normal      nitroGLYCERIN (NITROSTAT) 0.4 MG SL tablet Place 1 tablet under the tongue every 5 minutes as needed for Chest pain up to max of 3 total doses.  If no relief after 1 dose, call 911., Disp-25 tablet, R-3Normal      spironolactone (ALDACTONE) 25 MG tablet Take 1 tablet by mouth daily, Disp-30 tablet, R-3Normal      albuterol sulfate HFA (PROVENTIL;VENTOLIN;PROAIR) 108 (90 Base) MCG/ACT inhaler Inhale 2 puffs into the lungs every 4 hours as needed for Wheezing, Disp-18 g, R-3Normal      furosemide (LASIX) 20 MG tablet Take 1 tablet by mouth daily, Disp-30 tablet, R-1Normal      alfuzosin (UROXATRAL) 10 MG extended release tablet Take 10 mg by mouth dailyHistorical Med      montelukast (SINGULAIR) 10 MG tablet Take 10 mg by mouth nightlyHistorical Med      ferrous sulfate (IRON 325) 325 (65 Fe) MG tablet Take 325 mg by mouth daily (with breakfast)Historical Med      omeprazole (PRILOSEC) 20 MG delayed release capsule Take 20 mg by mouth dailyHistorical Med      atorvastatin (LIPITOR) 40 MG tablet Take 40 mg by mouth dailyHistorical Med      lisinopril (PRINIVIL;ZESTRIL) 2.5 MG tablet Take 2.5 mg by mouth dailyHistorical Med      canagliflozin (INVOKANA) 100 MG TABS tablet Take 100 mg by mouth every morning (before breakfast)Historical Med      insulin glargine (LANTUS;BASAGLAR) 100 UNIT/ML injection pen Inject 50 Units into the skin 2 times dailyHistorical Med      aspirin 81 MG chewable tablet Take 81 mg by mouth dailyHistorical Med      Liraglutide (VICTOZA) 18 MG/3ML SOPN SC injection Inject 1.8 mg into the skin dailyHistorical Med           ALLERGIES     is allergic to dapagliflozin. FAMILY HISTORY     He indicated that his mother is . He indicated that his father is . SOCIAL HISTORY       Social History     Tobacco Use    Smoking status: Every Day    Smokeless tobacco: Never    Tobacco comments:     Patient smokes a Hookah at least daily   Vaping Use    Vaping Use: Never used   Substance Use Topics    Alcohol use: No    Drug use: No     PHYSICAL EXAM     INITIAL VITALS: BP (!) 89/48   Pulse 71   Temp 98.1 °F (36.7 °C) (Oral)   Resp 16   Ht 5' 6.93\" (1.7 m)   Wt 211 lb (95.7 kg)   SpO2 95%   BMI 33.12 kg/m²    Physical Exam  Constitutional:       General: He is not in acute distress.      Appearance: He is well-developed. HENT:      Head: Normocephalic. Eyes:      Pupils: Pupils are equal, round, and reactive to light. Cardiovascular:      Rate and Rhythm: Normal rate and regular rhythm. Heart sounds: Normal heart sounds. Pulmonary:      Effort: Pulmonary effort is normal. No respiratory distress. Breath sounds: Normal breath sounds. Abdominal:      General: Bowel sounds are normal.      Palpations: Abdomen is soft. Tenderness: There is no abdominal tenderness. Musculoskeletal:         General: Normal range of motion. Skin:     General: Skin is warm and dry. Neurological:      Mental Status: He is alert and oriented to person, place, and time. [employer] DECISION MAKIN-year-old male presented to the emergency room with episode of syncope at home. Patient does have substantial cardiac history including cardiomyopathy and has defibrillator device. The device did not shock him tonight. Patient found to have low blood pressure by EMS with a blood pressure systolic 66Z over diastolic 74P. Here in the ED patient has blood pressure in the low 100s over 60s. Heart rate has been in sinus rhythm and stable. Patient has temperature of 100 here in the ED and white count of 12.6 with what appears to be developing cellulitis to the right upper extremity. Patient started on IV clindamycin and was given IV fluids here in the ED. Plan is for admission due to patient's complex cardiac history and syncopal episode along with the developing cellulitis. Case discussed with Jaime who will accept.     CRITICAL CARE:       PROCEDURES:    Procedures    DIAGNOSTIC RESULTS   EKG:All EKG's are interpreted by the Emergency Department Physician who either signs or Co-signs this chart in the absence of a cardiologist.    EKG- sinus with rate 81  T wave flattening lateral leads  No significant ST changes    QTc 420  Nonspecific EKG    RADIOLOGY:All plain film, CT, MRI, and formal ultrasound images (except ED bedside ultrasound) are read by the radiologist, see reports below, unless otherwisenoted in MDM or here. No orders to display     LABS: All lab results were reviewed by myself, and all abnormals are listed below.   Labs Reviewed   CBC WITH AUTO DIFFERENTIAL - Abnormal; Notable for the following components:       Result Value    WBC 12.6 (*)     RBC 6.45 (*)     MCV 71.0 (*)     MCH 20.2 (*)     RDW 19.9 (*)     Seg Neutrophils 72 (*)     Lymphocytes 18 (*)     Segs Absolute 9.07 (*)     All other components within normal limits   COMPREHENSIVE METABOLIC PANEL - Abnormal; Notable for the following components:    Glucose 148 (*)     Sodium 134 (*)     Alkaline Phosphatase 147 (*)     All other components within normal limits   BASIC METABOLIC PANEL W/ REFLEX TO MG FOR LOW K - Abnormal; Notable for the following components:    Glucose 62 (*)     All other components within normal limits   BASIC METABOLIC PANEL W/ REFLEX TO MG FOR LOW K - Abnormal; Notable for the following components:    Glucose 107 (*)     Sodium 134 (*)     CO2 18 (*)     All other components within normal limits   POC GLUCOSE FINGERSTICK - Abnormal; Notable for the following components:    POC Glucose 57 (*)     All other components within normal limits   POC GLUCOSE FINGERSTICK - Abnormal; Notable for the following components:    POC Glucose 117 (*)     All other components within normal limits   POC GLUCOSE FINGERSTICK - Abnormal; Notable for the following components:    POC Glucose 70 (*)     All other components within normal limits   POC GLUCOSE FINGERSTICK - Abnormal; Notable for the following components:    POC Glucose 254 (*)     All other components within normal limits   POC GLUCOSE FINGERSTICK - Abnormal; Notable for the following components:    POC Glucose 124 (*)     All other components within normal limits   POC GLUCOSE FINGERSTICK - Abnormal; Notable for the following components:    POC Glucose 285 (*) All other components within normal limits   TROPONIN   POC GLUCOSE FINGERSTICK   POCT GLUCOSE   POCT GLUCOSE   POCT GLUCOSE   POCT GLUCOSE   POCT GLUCOSE   POCT GLUCOSE       EMERGENCY DEPARTMENTCOURSE:         Vitals:    Vitals:    12/11/22 0043 12/11/22 0422 12/11/22 0807 12/11/22 0809   BP:  93/61 (!) 89/48 (!) 89/48   Pulse:  81 77 71   Resp:  16  16   Temp:  97.9 °F (36.6 °C)  98.1 °F (36.7 °C)   TempSrc:  Oral  Oral   SpO2:  94%  95%   Weight: 211 lb (95.7 kg)      Height:           The patient was given the following medications while in the emergency department:  Orders Placed This Encounter   Medications    0.9 % sodium chloride bolus    clindamycin (CLEOCIN) 600 mg in dextrose 5 % 50 mL IVPB     Order Specific Question:   Antimicrobial Indications     Answer:   Skin and Soft Tissue Infection    DISCONTD: amiodarone (CORDARONE) tablet 200 mg    DISCONTD: apixaban (ELIQUIS) tablet 5 mg     Order Specific Question:   Indication of Use     Answer:   A Fib/A Flutter    DISCONTD: aspirin chewable tablet 81 mg    DISCONTD: atorvastatin (LIPITOR) tablet 40 mg    DISCONTD: canagliflozin (INVOKANA) tablet 100 mg (Patient Supplied)     Order Specific Question:   Indication of Use     Answer:   Type 2 diabetes    DISCONTD: glyBURIDE (DIABETA) tablet 10 mg    DISCONTD: ferrous sulfate (FE TABS 325) EC tablet 325 mg    DISCONTD: vitamin D3 (CHOLECALCIFEROL) tablet 5,000 Units    DISCONTD: fluticasone (FLONASE) 50 MCG/ACT nasal spray 1 spray    DISCONTD: insulin glargine (LANTUS) injection vial 50 Units    DISCONTD: Liraglutide (VICTOZA) SC injection 1.8 mg (Patient Supplied)     Order Specific Question:   Please select a reason the therapeutic interchange was not accepted:      Answer:   Sandhya Spencer for Pharmacy to Substitute    DISCONTD: cetirizine (ZYRTEC) tablet 10 mg    DISCONTD: metoprolol tartrate (LOPRESSOR) tablet 25 mg    DISCONTD: lisinopril (PRINIVIL;ZESTRIL) tablet 2.5 mg    DISCONTD: montelukast (SINGULAIR) tablet 10 mg    DISCONTD: pantoprazole (PROTONIX) tablet 40 mg    DISCONTD: sodium chloride flush 0.9 % injection 5-40 mL    DISCONTD: sodium chloride flush 0.9 % injection 10 mL    DISCONTD: 0.9 % sodium chloride infusion    DISCONTD: potassium chloride (KLOR-CON M) extended release tablet 40 mEq    DISCONTD: potassium bicarb-citric acid (EFFER-K) effervescent tablet 40 mEq    DISCONTD: potassium chloride 10 mEq/100 mL IVPB (Peripheral Line)    DISCONTD: magnesium sulfate 1000 mg in dextrose 5% 100 mL IVPB    DISCONTD: ondansetron (ZOFRAN-ODT) disintegrating tablet 4 mg    DISCONTD: ondansetron (ZOFRAN) injection 4 mg    DISCONTD: polyethylene glycol (GLYCOLAX) packet 17 g    DISCONTD: acetaminophen (TYLENOL) tablet 650 mg    DISCONTD: acetaminophen (TYLENOL) suppository 650 mg    DISCONTD: cephALEXin (KEFLEX) capsule 500 mg     Order Specific Question:   Antimicrobial Indications     Answer:   Skin and Soft Tissue Infection     Order Specific Question:   Skin duration of therapy     Answer:   7 days    DISCONTD: glucose chewable tablet 16 g    DISCONTD: dextrose bolus 10% 125 mL    DISCONTD: dextrose bolus 10% 250 mL    DISCONTD: glucagon (rDNA) injection 1 mg    DISCONTD: dextrose 10 % infusion    DISCONTD: glipiZIDE (GLUCOTROL) tablet 10 mg    DISCONTD: insulin glargine (LANTUS) injection vial 50 Units    DISCONTD: sulfamethoxazole-trimethoprim (BACTRIM DS;SEPTRA DS) 800-160 MG per tablet 1 tablet     Order Specific Question:   Antimicrobial Indications     Answer:   Skin and Soft Tissue Infection     Order Specific Question:   Skin duration of therapy     Answer:   5 days     Order Specific Question:   Suspected Organism(s)     Answer:   Cellulitis over privious IV, MRSA    DISCONTD: furosemide (LASIX) tablet 20 mg    DISCONTD: tamsulosin (FLOMAX) capsule 0.4 mg    DISCONTD: spironolactone (ALDACTONE) tablet 25 mg    ketorolac (TORADOL) injection 15 mg    DISCONTD: HYDROcodone-acetaminophen (NORCO) 5-325 MG per tablet 1 tablet    DISCONTD: HYDROcodone-acetaminophen (NORCO) 5-325 MG per tablet 2 tablet    sulfamethoxazole-trimethoprim (BACTRIM DS;SEPTRA DS) 800-160 MG per tablet     Sig: Take 1 tablet by mouth every 12 hours for 5 days     Dispense:  10 tablet     Refill:  0     CONSULTS:  IP CONSULT TO INTERNAL MEDICINE  IP CONSULT TO CARDIOLOGY    FINAL IMPRESSION      1. Syncope and collapse    2. Cellulitis of right upper extremity          DISPOSITION/PLAN   DISPOSITION Admitted 12/10/2022 01:50:00 AM      PATIENT REFERRED TO:  No follow-up provider specified. DISCHARGE MEDICATIONS:  Discharge Medication List as of 12/11/2022  1:24 PM        START taking these medications    Details   sulfamethoxazole-trimethoprim (BACTRIM DS;SEPTRA DS) 800-160 MG per tablet Take 1 tablet by mouth every 12 hours for 5 days, Disp-10 tablet, R-0Normal           Wagner Melgar MD  Attending Emergency Physician      Care during this encounter was due to an unprecedented national emergency due to COVID-19.              Adams Johns MD  12/10/22 0310       Adams Johns MD  12/19/22 4141

## 2022-12-10 NOTE — ED NOTES
ED to inpatient nurses report     Chief Complaint   Patient presents with    Dizziness      Present to ED from home  LOC: alert and oriented pt speaks no english  Vital signs   Vitals:    12/10/22 0126 12/10/22 0136 12/10/22 0146 12/10/22 0156   BP: (!) 90/57  (!) 90/58 102/65   Pulse: 80 83 78    Resp: 18 22     Temp:       TempSrc:       SpO2: 94% 95% 94% 94%   Weight:       Height:          Oxygen Baseline room air      Current needs required see admission orders   SEPSIS: no  no] Lactate X 2 ordered (Yes or No)  [no Antibiotics given (Yes or No)  yes] IV Fluids ordered (Yes or No)             [yes IV completed (Yes or No)  [yes Hourly Vital Signs (Validated)  [see admission orders Outstanding Orders:     LDAs:   Peripheral IV 12/09/22 Left Antecubital (Active)   Alcohol Cap Used Yes 12/09/22 2258   Dressing Status New dressing applied;Clean, dry & intact 12/09/22 2258   Dressing Type Transparent 12/09/22 2258   Dressing Intervention New 12/09/22 2258     Mobility: up with assist  Fall Risk:  yes  Pending ED orders: see admission orders  Present condition: stable  Code Status: full  Consults: IP CONSULT TO INTERNAL MEDICINE  []  Hospitalist  Completed  [] yes [] no Who:   []  Medicine  Completed  [] yes [] No Who:   []  Cardiology  Completed  [] yes [] No Who:   []  GI   Completed  [] yes [] No Who:   []  Neurology  Completed  [] yes [] No Who:   []  Nephrology Completed  [] yes [] No Who:    []  Vascular  Completed  [] yes [] No Who:   []  Ortho  Completed  [] yes [] No Who:     []  Surgery  Completed  [] yes [] No Who:    []  Urology  Completed  [] yes [] No Who:    []  CT Surgery Completed  [] yes [] No Who:   []  Podiatry  Completed  [] yes [] No Who:    []  Other    Completed  [] yes [] No Who:  Interventions: 500 ml saline cleocin given  Important Events: pt was having dizziness at home per ems some of the patients blood pressures were in the 96U systolic pt has a pacemaker pt was given cleocin for a cellulitis on his right upper arm        Electronically signed by Garry Cruz RN on 12/10/2022 at 3:19 AM     Myrle Schwab, RN  12/10/22 5227

## 2022-12-10 NOTE — H&P
[facility]  Office: [address], DO, Patricio Edwards, DO, Nayeli Jacobo, DO, Mendez Taylor Blood, DO, Christiano Neumann MD, Echo Willett MD, Law Hays MD, Ev Birch MD,  Danielle Pool MD, Elizabeth Ricci MD, Adriana Rondon, DO, Stefania Velásquez MD,  Maximino Godoy MD, Emil Toribio MD, Alicia Dillard DO, Maria G Mccann MD, Marlene Taylor MD, Gio Cantu DO, Ollie Sam MD, Lashaun Álvarez MD, Feliciano Quiñonez MD, Kristine Nieves MD, Celeste Rodriguez, DO, Treasure Jean Baptiste MD, Reena Bolanos MD, Sanjay Cooney, [facility],  Christina Valerio, CNP, Alexandre Price, CNP, Citlalli Holden, CNP,  Franki Lundborg, [facility], Misty Greenberg, CNP, Ly Key, CNP, Louisa Troy, CNP, Trenton Purcell, CNP, Lyly Gabriel, CNP, Doretha Jaramillo PA-C, Howie Pop, CNS, Joy No, CNP, Sinai Bhatia, [facility]    HISTORY AND PHYSICAL EXAMINATION            Date:   12/10/2022  Patient name:  Odell Zelaya  Date of admission:  12/9/2022 10:39 PM  MRN:   [id number]  Account:  [de-identified]  YOB: 1970  PCP:    Mazin Langston MD  Room:   [address] [place] [address]  Code Status:    Full Code    Chief Complaint:     Chief Complaint   Patient presents with    Dizziness       History Obtained From:     patient, electronic medical record    History of Present Illness: Odell Zelaya is a 47 y.o. male who presents to the hosptial for evaluation of low blood pressure. He says he was sitting at home in his couch when he started feeling very light headed and dizzy. He says he passed out on the cough for a few minutes prior to regaining consciousness. Denies hitting head. No loss of bowel or bladder function. Patient was recently discharged from the hospital after being admitted for similar symptoms. At that time he was found to have ventricular tachycardia and was seen by cardiology who put him on amiodarone.   Patient has a history of heart failure with reduced ejection fraction and was recently started on Aldactone. He is non-English-speaking and there seems to be some confusion with his medications. He has most of his medications in a brown bag. He says its possible that he may have taken more medications than he was supposed to. His blood pressure at home was in 52'D systolic. He denies any chest pain, shortness of breath. He does also have erythema on arm     Past Medical History:     Past Medical History:   Diagnosis Date    CAD (coronary artery disease)     [facility] mellitus [facility] [address]     Hyperlipidemia     Hypertension     MI (myocardial infarction) ([facility] [address]         Past Surgical History:     Past Surgical History:   Procedure Laterality Date    CHOLECYSTECTOMY      MULTIPLE TOOTH EXTRACTIONS          Medications Prior to Admission:     Prior to Admission medications    Medication Sig Start Date End Date Taking? Authorizing Provider   amiodarone (CORDARONE) 200 MG tablet Take 1 tablet by mouth daily 12/10/22 1/9/23  Kaylan Villarreal MD   apixaban (ELIQUIS) 5 MG TABS tablet Take 1 tablet by mouth 2 times daily 12/9/22   Kaylan Villarreal MD   Cholecalciferol (VITAMIN D3) 125 MCG (5000 UT) TABS Take 1 tablet by mouth Daily 12/9/22   Kaylan Villarreal MD   fluticasone [facility]) 50 MCG/ACT nasal spray 1 spray by Nasal route daily 12/9/22   Kaylan Villarreal MD   metoprolol tartrate (LOPRESSOR) 25 MG tablet Take 1 tablet by mouth 2 times daily 12/9/22   Kaylan Villarreal MD   nitroGLYCERIN (NITROSTAT) 0.4 MG SL tablet Place 1 tablet under the tongue every 5 minutes as needed for Chest pain up to max of 3 total doses.  If no relief after 1 dose, call 911. 12/9/22   Kaylan Villarreal MD   spironolactone (ALDACTONE) 25 MG tablet Take 1 tablet by mouth daily 12/10/22   Kaylan Villarreal MD   albuterol sulfate HFA (PROVENTIL;VENTOLIN;PROAIR) 108 (90 Base) MCG/ACT inhaler Inhale 2 puffs into the lungs every 4 hours as needed for Wheezing 12/9/22 [address] Tomi Urbina MD   furosemide (LASIX) 20 MG tablet Take 1 tablet by mouth daily 12/9/22 1/8/23  [address] Tomi Urbina MD   alfuzosin (UROXATRAL) 10 MG extended release tablet Take 10 mg by mouth daily    Historical Provider, MD   montelukast (SINGULAIR) 10 MG tablet Take 10 mg by mouth nightly    Historical Provider, MD   ferrous sulfate (IRON 325) 325 (65 Fe) MG tablet Take 325 mg by mouth daily (with breakfast)    Historical Provider, MD   omeprazole (PRILOSEC) 20 MG delayed release capsule Take 20 mg by mouth daily    Historical Provider, MD   atorvastatin (LIPITOR) 40 MG tablet Take 40 mg by mouth daily    Historical Provider, MD   lisinopril (PRINIVIL;ZESTRIL) 2.5 MG tablet Take 2.5 mg by mouth daily    Historical Provider, MD   metroNIDAZOLE (METROGEL) 0.75 % gel Apply topically 2 times daily Indications: to face    Historical Provider, MD   canagliflozin (INVOKANA) 100 MG TABS tablet Take 100 mg by mouth every morning (before breakfast)    Historical Provider, MD   insulin glargine (LANTUS;BASAGLAR) 100 UNIT/ML injection pen Inject 50 Units into the skin 2 times daily    Historical Provider, MD   aspirin 81 MG chewable tablet Take 81 mg by mouth daily    Historical Provider, MD   Liraglutide (VICTOZA) 18 MG/3ML SOPN SC injection Inject 1.8 mg into the skin daily    Historical Provider, MD   loratadine (CLARITIN) 10 MG tablet Take 10 mg by mouth daily    Historical Provider, MD   glyBURIDE (DIABETA) 5 MG tablet Take 10 mg by mouth 2 times daily    Historical Provider, MD        Allergies:     Dapagliflozin    Social History:     Tobacco:    reports that he has been smoking. He has never used smokeless tobacco.  Alcohol:      reports no history of alcohol use. Drug Use:  reports no history of drug use. Family History:     Family History   Problem Relation Age of Onset    Heart Attack Mother     Heart Attack Father        Review of Systems:     Positive and Negative as described in HPI.     CONSTITUTIONAL: negative for fevers, chills, sweats, fatigue, weight loss  HEENT:  negative for vision, hearing changes, runny nose, throat pain  RESPIRATORY:  negative for shortness of breath, cough, congestion, wheezing  CARDIOVASCULAR:  negative for chest pain, palpitations  GASTROINTESTINAL:  negative for nausea, vomiting, diarrhea, constipation, change in bowel habits, abdominal pain   GENITOURINARY:  negative for difficulty of urination, burning with urination, frequency   INTEGUMENT:  negative for rash, skin lesions, easy bruising   HEMATOLOGIC/LYMPHATIC:  negative for swelling/edema   ALLERGIC/IMMUNOLOGIC:  negative for urticaria , itching  ENDOCRINE:  negative increase in drinking, increase in urination, hot or cold intolerance  MUSCULOSKELETAL:  negative joint pains, muscle aches, swelling of joints  NEUROLOGICAL:  negative for headaches, dizziness, lightheadedness, numbness, pain, tingling extremities  BEHAVIOR/PSYCH:  negative for depression, anxiety    Physical Exam:   /76   Pulse 77   Temp 98.1 °F (36.7 °C) (Temporal)   Resp 14   Ht 5' 6.93\" (1.7 m)   Wt 209 lb 7 oz (95 kg)   SpO2 95%   BMI 32.87 kg/m²   Temp (24hrs), Av.3 °F (36.8 °C), Min:97.4 °F (36.3 °C), Max:100 °F (37.8 °C)    Recent Labs     12/10/22  0526 12/10/22  0544 12/10/22  0632 12/10/22  0904   POCGLU 57* 86 117* 70*       Intake/Output Summary (Last 24 hours) at 12/10/2022 1229  Last data filed at 12/10/2022 0950  Gross per 24 hour   Intake 120 ml   Output 350 ml   Net -230 ml       General Appearance: alert, well appearing, and in no acute distress  Mental status: oriented to person, place, and time  Head: normocephalic, atraumatic  Eye: no icterus, redness, pupils equal and reactive, extraocular eye movements intact, conjunctiva clear  Ear: normal external ear, no discharge, hearing intact  Nose: no drainage noted  Mouth: mucous membranes moist  Neck: supple, no carotid bruits, thyroid not palpable  Lungs: Bilateral equal air entry, clear to ausculation, no wheezing, rales or rhonchi, normal effort  Cardiovascular: normal rate, regular rhythm, no murmur, gallop, rub  Abdomen: Soft, nontender, nondistended, normal bowel sounds, no hepatomegaly or splenomegaly  Neurologic: There are no new focal motor or sensory deficits, normal muscle tone and bulk, no abnormal sensation, normal speech, cranial nerves II through XII grossly intact  Skin: No gross lesions, rashes, bruising or bleeding on exposed skin area +redness over right elbow/previous IV site. Able to move arm without pain.    Extremities: peripheral pulses palpable, no pedal edema or calf pain with palpation  Psych: normal affect    Investigations:      Laboratory Testing:  Recent Results (from the past 24 hour(s))   EKG 12 Lead    Collection Time: 12/09/22 10:44 PM   Result Value Ref Range    Ventricular Rate 81 BPM    Atrial Rate 81 BPM    P-R Interval 162 ms    QRS Duration 96 ms    Q-T Interval 362 ms    QTc Calculation (Bazett) 420 ms    P Axis 36 degrees    R Axis -20 degrees    T Axis 92 degrees   CBC with Auto Differential    Collection Time: 12/09/22 10:50 PM   Result Value Ref Range    WBC 12.6 (H) 3.5 - 11.3 k/uL    RBC 6.45 (H) 4.21 - 5.77 m/uL    Hemoglobin 13.0 13.0 - 17.0 g/dL    Hematocrit 45.8 40.7 - 50.3 %    MCV 71.0 (L) 82.6 - 102.9 fL    MCH 20.2 (L) 25.2 - 33.5 pg    MCHC 28.4 28.4 - 34.8 g/dL    RDW 19.9 (H) 11.8 - 14.4 %    Platelets 417 753 - 551 k/uL    MPV 10.5 8.1 - 13.5 fL    NRBC Automated 0.0 0.0 per 100 WBC    RBC Morphology ANISOCYTOSIS PRESENT     Seg Neutrophils 72 (H) 36 - 65 %    Lymphocytes 18 (L) 24 - 43 %    Monocytes 8 3 - 12 %    Eosinophils % 1 1 - 4 %    Basophils 1 0 - 2 %    Immature Granulocytes 0 0 %    Segs Absolute 9.07 (H) 1.50 - 8.10 k/uL    Absolute Lymph # 2.29 1.10 - 3.70 k/uL    Absolute Mono # 0.97 0.10 - 1.20 k/uL    Absolute Eos # 0.17 0.00 - 0.44 k/uL    Basophils Absolute 0.06 0.00 - 0.20 k/uL    Absolute Immature Granulocyte 0.05 0.00 - 0.30 k/uL   CMP    Collection Time: 12/09/22 10:50 PM   Result Value Ref Range    Glucose 148 (H) 70 - 99 mg/dL    BUN 12 6 - 20 mg/dL    Creatinine 0.99 0.70 - 1.20 mg/dL    Est, Glom Filt Rate >60 >60 mL/min/1.73m2    Bun/Cre Ratio 12 9 - 20    Calcium 8.9 8.6 - 10.4 mg/dL    Sodium 134 (L) 135 - 144 mmol/L    Potassium 4.2 3.7 - 5.3 mmol/L    Chloride 98 98 - 107 mmol/L    CO2 24 20 - 31 mmol/L    Anion Gap 12 9 - 17 mmol/L    Alkaline Phosphatase 147 (H) 40 - 129 U/L    ALT 14 5 - 41 U/L    AST 13 <40 U/L    Total Bilirubin 0.6 0.3 - 1.2 mg/dL    Total Protein 7.5 6.4 - 8.3 g/dL    Albumin 4.2 3.5 - 5.2 g/dL   Troponin    Collection Time: 12/09/22 10:50 PM   Result Value Ref Range    Troponin, High Sensitivity 17 0 - 22 ng/L   Basic Metabolic Panel w/ Reflex to MG    Collection Time: 12/10/22  4:49 AM   Result Value Ref Range    Glucose 62 (L) 70 - 99 mg/dL    BUN 12 6 - 20 mg/dL    Creatinine 0.95 0.70 - 1.20 mg/dL    Est, Glom Filt Rate >60 >60 mL/min/1.73m2    Bun/Cre Ratio 13 9 - 20    Calcium 8.7 8.6 - 10.4 mg/dL    Sodium 137 135 - 144 mmol/L    Potassium 4.0 3.7 - 5.3 mmol/L    Chloride 102 98 - 107 mmol/L    CO2 24 20 - 31 mmol/L    Anion Gap 11 9 - 17 mmol/L   POC Glucose Fingerstick    Collection Time: 12/10/22  5:26 AM   Result Value Ref Range    POC Glucose 57 (L) 75 - 110 mg/dL   POC Glucose Fingerstick    Collection Time: 12/10/22  5:44 AM   Result Value Ref Range    POC Glucose 86 75 - 110 mg/dL   POC Glucose Fingerstick    Collection Time: 12/10/22  6:32 AM   Result Value Ref Range    POC Glucose 117 (H) 75 - 110 mg/dL   POC Glucose Fingerstick    Collection Time: 12/10/22  9:04 AM   Result Value Ref Range    POC Glucose 70 (L) 75 - 110 mg/dL       Imaging/Diagnostics:  XR CHEST PORTABLE    Result Date: 12/7/2022  No confluent airspace consolidation. Stable cardiomegaly without evidence of CHF.        Assessment :      Hospital Problems             Last Modified POA    * (Principal) Syncope and collapse 12/10/2022 Yes    Cellulitis of right upper extremity 12/10/2022 Yes    Hypotension due to medication 12/10/2022 Yes    Essential hypertension 12/10/2022 Yes    Hyperlipidemia 12/10/2022 Yes    Neuropathy due to secondary diabetes mellitus ([address] 12/10/2022 Yes    LV (left ventricular) mural thrombus-mobile 12/10/2022 Yes    Obesity with body mass index 30 or greater 12/10/2022 Yes    Type 2 diabetes mellitus with hyperglycemia, with long-term current use of insulin ([address] 12/10/2022 Yes    Chronic systolic heart failure ([facility] [address] 12/10/2022 Yes       Plan:     Patient status inpatient in the Progressive Unit/Step down    Syncope due to hypotension: recently discharged after being admitted for AICD shock/Vtach. I think there is confusion with his medications. Hold lisinopril for now and decrease dose of aldactone. Check orthostatic vital signs. Need to get AICD interrogated again. Keep pt on telemetry. Cellulitis: Over pervious IV line. Will switch to bactrim. He is having pain with flexion/extension of hand   Recent admission for Vtach: amiodarone and metoprolol. If Interrogation shows vtach will need to be re bolused. Cardiology following. Heart failure due to systolic and diastolic dysfunction:  compensated currentlly . Continue GDMT as tolerated. Sodium restriction. Replace potassium and magnesium. LV thrombus at apex of LV: Previously switched to Eliquis by cardiology due to concern for coumadin failure will continue  Ischemic cardiomyopathy s/p ICD placement: ASA/Lipitor metoprolol  Diabetes mellitus type II with Hypoglycemia: A1c 7.8 last week: Decrease lantus to 50 units daily. Continue invokana. Dyslipidemia: statin  BPH: alpha blocker  PTOT  Labs, imaging, ECG reviewed  Avoid polypharmacy.      Consultations:   IP CONSULT TO INTERNAL MEDICINE  IP CONSULT TO CARDIOLOGY     Patient is admitted as inpatient status because of co-morbidities listed above, severity of signs and symptoms as outlined, requirement for current medical therapies and most importantly because of direct risk to patient if care not provided in a hospital setting. Expected length of stay > 48 hours.     Roland Harding DO  12/10/2022  12:29 PM    Copy sent to Dr. Ari Dennis MD

## 2022-12-10 NOTE — ED NOTES
Pt presents to the er per ems for c/o dizziness per ems the patient had systolic blood pressures in the 70s upon their arrival to the patients house upon arrival to the er the patient is alert pink warm and dry with respirations even and unlabored     Jackie Cain RN  12/10/22 2989

## 2022-12-10 NOTE — PROGRESS NOTES
Nutrition Note    Patient does not eat pork. Appropriate notes have been made to diet order and  programs. Continue Regular diet.         Freeman GENTILE, RDN, LDN  Lead Clinical Dietitian  RD Office Phone [phone]

## 2022-12-11 VITALS
HEART RATE: 71 BPM | OXYGEN SATURATION: 95 % | HEIGHT: 67 IN | SYSTOLIC BLOOD PRESSURE: 89 MMHG | WEIGHT: 211 LBS | RESPIRATION RATE: 16 BRPM | TEMPERATURE: 98.1 F | BODY MASS INDEX: 33.12 KG/M2 | DIASTOLIC BLOOD PRESSURE: 48 MMHG

## 2022-12-11 LAB
ANION GAP SERPL CALCULATED.3IONS-SCNC: 14 MMOL/L (ref 9–17)
BUN BLDV-MCNC: 12 MG/DL (ref 6–20)
BUN/CREAT BLD: 15 (ref 9–20)
CALCIUM SERPL-MCNC: 8.7 MG/DL (ref 8.6–10.4)
CHLORIDE BLD-SCNC: 102 MMOL/L (ref 98–107)
CO2: 18 MMOL/L (ref 20–31)
CREAT SERPL-MCNC: 0.79 MG/DL (ref 0.7–1.2)
GFR SERPL CREATININE-BSD FRML MDRD: >60 ML/MIN/1.73M2
GLUCOSE BLD-MCNC: 107 MG/DL (ref 70–99)
GLUCOSE BLD-MCNC: 124 MG/DL (ref 75–110)
GLUCOSE BLD-MCNC: 285 MG/DL (ref 75–110)
POTASSIUM SERPL-SCNC: 4.3 MMOL/L (ref 3.7–5.3)
SODIUM BLD-SCNC: 134 MMOL/L (ref 135–144)

## 2022-12-11 PROCEDURE — 99225 PR SBSQ OBSERVATION CARE/DAY 25 MINUTES: CPT | Performed by: INTERNAL MEDICINE

## 2022-12-11 PROCEDURE — G0378 HOSPITAL OBSERVATION PER HR: HCPCS

## 2022-12-11 PROCEDURE — 36415 COLL VENOUS BLD VENIPUNCTURE: CPT

## 2022-12-11 PROCEDURE — 6360000002 HC RX W HCPCS: Performed by: CLINICAL NURSE SPECIALIST

## 2022-12-11 PROCEDURE — 6370000000 HC RX 637 (ALT 250 FOR IP): Performed by: INTERNAL MEDICINE

## 2022-12-11 PROCEDURE — 82947 ASSAY GLUCOSE BLOOD QUANT: CPT

## 2022-12-11 PROCEDURE — 6370000000 HC RX 637 (ALT 250 FOR IP): Performed by: NURSE PRACTITIONER

## 2022-12-11 PROCEDURE — 80048 BASIC METABOLIC PNL TOTAL CA: CPT

## 2022-12-11 PROCEDURE — 2580000003 HC RX 258: Performed by: NURSE PRACTITIONER

## 2022-12-11 RX ORDER — HYDROCODONE BITARTRATE AND ACETAMINOPHEN 5; 325 MG/1; MG/1
1 TABLET ORAL EVERY 4 HOURS PRN
Status: DISCONTINUED | OUTPATIENT
Start: 2022-12-11 | End: 2022-12-11 | Stop reason: HOSPADM

## 2022-12-11 RX ORDER — SULFAMETHOXAZOLE AND TRIMETHOPRIM 800; 160 MG/1; MG/1
1 TABLET ORAL EVERY 12 HOURS SCHEDULED
Qty: 10 TABLET | Refills: 0 | Status: SHIPPED | OUTPATIENT
Start: 2022-12-11 | End: 2022-12-16

## 2022-12-11 RX ORDER — KETOROLAC TROMETHAMINE 15 MG/ML
15 INJECTION, SOLUTION INTRAMUSCULAR; INTRAVENOUS ONCE
Status: COMPLETED | OUTPATIENT
Start: 2022-12-11 | End: 2022-12-11

## 2022-12-11 RX ORDER — HYDROCODONE BITARTRATE AND ACETAMINOPHEN 5; 325 MG/1; MG/1
2 TABLET ORAL EVERY 4 HOURS PRN
Status: DISCONTINUED | OUTPATIENT
Start: 2022-12-11 | End: 2022-12-11 | Stop reason: HOSPADM

## 2022-12-11 RX ADMIN — FUROSEMIDE 20 MG: 20 TABLET ORAL at 08:08

## 2022-12-11 RX ADMIN — METOPROLOL TARTRATE 25 MG: 25 TABLET, FILM COATED ORAL at 08:07

## 2022-12-11 RX ADMIN — ASPIRIN 81 MG CHEWABLE TABLET 81 MG: 81 TABLET CHEWABLE at 08:08

## 2022-12-11 RX ADMIN — SULFAMETHOXAZOLE AND TRIMETHOPRIM 1 TABLET: 800; 160 TABLET ORAL at 08:07

## 2022-12-11 RX ADMIN — ATORVASTATIN CALCIUM 40 MG: 40 TABLET, FILM COATED ORAL at 08:08

## 2022-12-11 RX ADMIN — SODIUM CHLORIDE, PRESERVATIVE FREE 10 ML: 5 INJECTION INTRAVENOUS at 09:07

## 2022-12-11 RX ADMIN — PANTOPRAZOLE SODIUM 40 MG: 40 TABLET, DELAYED RELEASE ORAL at 06:08

## 2022-12-11 RX ADMIN — KETOROLAC TROMETHAMINE 15 MG: 15 INJECTION, SOLUTION INTRAMUSCULAR; INTRAVENOUS at 06:08

## 2022-12-11 RX ADMIN — INSULIN GLARGINE 50 UNITS: 100 INJECTION, SOLUTION SUBCUTANEOUS at 08:07

## 2022-12-11 RX ADMIN — FERROUS SULFATE TAB EC 325 MG (65 MG FE EQUIVALENT) 325 MG: 325 (65 FE) TABLET DELAYED RESPONSE at 08:07

## 2022-12-11 RX ADMIN — AMIODARONE HYDROCHLORIDE 200 MG: 200 TABLET ORAL at 08:08

## 2022-12-11 RX ADMIN — APIXABAN 5 MG: 5 TABLET, FILM COATED ORAL at 08:08

## 2022-12-11 RX ADMIN — TAMSULOSIN HYDROCHLORIDE 0.4 MG: 0.4 CAPSULE ORAL at 08:07

## 2022-12-11 ASSESSMENT — PAIN - FUNCTIONAL ASSESSMENT: PAIN_FUNCTIONAL_ASSESSMENT: ACTIVITIES ARE NOT PREVENTED

## 2022-12-11 ASSESSMENT — PAIN DESCRIPTION - ORIENTATION: ORIENTATION: RIGHT

## 2022-12-11 ASSESSMENT — PAIN DESCRIPTION - LOCATION: LOCATION: ARM

## 2022-12-11 ASSESSMENT — PAIN DESCRIPTION - DESCRIPTORS: DESCRIPTORS: ACHING

## 2022-12-11 ASSESSMENT — PAIN SCALES - GENERAL: PAINLEVEL_OUTOF10: 8

## 2022-12-11 NOTE — DISCHARGE SUMMARY
[facility]  Office: [address], DO, Donna Schaefer, DO, Casieanais Beltran, DO, Delfina Unger Blood, DO, Iliana John MD, Samra Cazares MD, Yvone Phalen, MD, Romeo Arteaga MD,  Homer Hashimoto, MD, Britt Miller MD, Concha Restrepo, DO, Aisha Jose MD,  Aide Ojeda MD, Sera Hendrix MD, Robb Serrato, DO, Ashkan Bennett MD, Carolina Presley MD, Bianca Hare, DO, Mansoor Trinidad MD, Beatrice Kumar MD, Calvin Jovel MD, Adina Wilcox MD, Nelson Zheng DO, Richy Garcia MD, Stanislaw Dong MD, Elida Metz, CNP,  Lavonne Vela, CNP, Johan Sprague, CNP, Neha Francois, CNP,  Maria Eugenia Wooten, DNP, Consuelo Felipe, CNP, Florencio Garcia, CNP, Chuy Flowers, CNP, Blanca Hall, CNP, Paul Lewis, CNP, Sofía Oconnor, PAMorales[facility], Ingris Ulloa, CNS, Noé Dumas, CNP, Sudhakar Jones, [facility]    Discharge Summary     Patient ID: Scarlett Rose  :  1970   MRN: [id number]     ACCOUNT:  [de-identified]   Patient's PCP: Blinda Hatchet, MD  Admit Date: 2022   Discharge Date: 2022     Length of Stay: 0  Code Status:  Prior  Admitting Physician: Maddy Michaud DO  Discharge Physician: Lynn Bonilla MD     Active Discharge Diagnoses:     Hospital Problem Lists:  Principal Problem:    Syncope and collapse  Active Problems:    Cellulitis of right upper extremity    Hypotension due to medication    Essential hypertension    Hyperlipidemia    Neuropathy due to secondary diabetes mellitus (HCC)    LV (left ventricular) mural thrombus-mobile    Obesity with body mass index 30 or greater    Type 2 diabetes mellitus with hyperglycemia, with long-term current use of insulin ([facility] [address]    Chronic systolic heart failure (HCC)  Resolved Problems:    * No resolved hospital problems. *      Admission Condition:  fair     Discharged Condition: stable    Hospital Stay:   Admitting history:   Scarlett Rose is a 47 y.o. male who presents to the hosptial for evaluation of low blood pressure. He says he was sitting at home in his couch when he started feeling very light headed and dizzy. He says he passed out on the cough for a few minutes prior to regaining consciousness. Denies hitting head. No loss of bowel or bladder function. Patient was recently discharged from the hospital after being admitted for similar symptoms. At that time he was found to have ventricular tachycardia and was seen by cardiology who put him on amiodarone. Patient has a history of heart failure with reduced ejection fraction and was recently started on Aldactone. He is non-English-speaking and there seems to be some confusion with his medications. He has most of his medications in a brown bag. He says its possible that he may have taken more medications than he was supposed to. His blood pressure at home was in 92'E systolic. He denies any chest pain, shortness of breath.  He does also have erythema on arm     Hospital Course:   Talked through  on phone juan carlos   Denies dizziness today  States probably he took multiple pills at the same time at home leading to dizziness  Right arm cellulitis/swelling is better today    Plan:         Discussed to keep medicines in his brownbag only which are being prescribed now and should keep other medicines separately, avoid polypharmacy  Discussed to space out medications instead of taking altogether  Continue Bactrim for 5 days for cellulitis right arm  Continue Eliquis due to concern for Coumadin failure as he is having LV thrombus which is unresolved  Discharge home today  Significant therapeutic interventions: See above notes    Significant Diagnostic Studies:   Labs / Micro:  CBC:   Lab Results   Component Value Date/Time    WBC 12.6 12/09/2022 10:50 PM    RBC 6.45 12/09/2022 10:50 PM    HGB 13.0 12/09/2022 10:50 PM    HCT 45.8 12/09/2022 10:50 PM    MCV 71.0 12/09/2022 10:50 PM    MCH 20.2 12/09/2022 10:50 PM    MCHC 28.4 12/09/2022 10:50 PM    RDW 19.9 12/09/2022 10:50 PM     12/09/2022 10:50 PM     BMP:    Lab Results   Component Value Date/Time    GLUCOSE 107 12/11/2022 06:13 AM     12/11/2022 06:13 AM    K 4.3 12/11/2022 06:13 AM     12/11/2022 06:13 AM    CO2 18 12/11/2022 06:13 AM    ANIONGAP 14 12/11/2022 06:13 AM    BUN 12 12/11/2022 06:13 AM    CREATININE 0.79 12/11/2022 06:13 AM    BUNCRER 15 12/11/2022 06:13 AM    CALCIUM 8.7 12/11/2022 06:13 AM    LABGLOM >60 12/11/2022 06:13 AM    GFRAA >60 03/22/2022 02:35 AM    GFR      03/22/2022 02:35 AM     HFP:    Lab Results   Component Value Date/Time    PROT 7.5 12/09/2022 10:50 PM     CMP:    Lab Results   Component Value Date/Time    GLUCOSE 107 12/11/2022 06:13 AM     12/11/2022 06:13 AM    K 4.3 12/11/2022 06:13 AM     12/11/2022 06:13 AM    CO2 18 12/11/2022 06:13 AM    BUN 12 12/11/2022 06:13 AM    CREATININE 0.79 12/11/2022 06:13 AM    ANIONGAP 14 12/11/2022 06:13 AM    ALKPHOS 147 12/09/2022 10:50 PM    ALT 14 12/09/2022 10:50 PM    AST 13 12/09/2022 10:50 PM    BILITOT 0.6 12/09/2022 10:50 PM    LABALBU 4.2 12/09/2022 10:50 PM    LABGLOM >60 12/11/2022 06:13 AM    GFRAA >60 03/22/2022 02:35 AM    GFR      03/22/2022 02:35 AM    PROT 7.5 12/09/2022 10:50 PM    CALCIUM 8.7 12/11/2022 06:13 AM     PT/INR:    Lab Results   Component Value Date/Time    PROTIME 21.4 12/08/2022 04:55 AM    INR 1.9 12/08/2022 04:55 AM     PTT:   Lab Results   Component Value Date/Time    APTT 25.2 03/22/2022 02:35 AM     FLP:    Lab Results   Component Value Date/Time    CHOL 110 12/08/2022 04:55 AM    TRIG 205 12/08/2022 04:55 AM    HDL 27 12/08/2022 04:55 AM     U/A:    Lab Results   Component Value Date/Time    COLORU Yellow 03/22/2022 03:00 AM    TURBIDITY Clear 03/22/2022 03:00 AM    SPECGRAV 1.023 03/22/2022 03:00 AM    HGBUR NEGATIVE 03/22/2022 03:00 AM    PHUR 5.5 03/22/2022 03:00 AM    PROTEINU NEGATIVE 03/22/2022 03:00 AM GLUCOSEU NEGATIVE 03/22/2022 03:00 AM    KETUA NEGATIVE 03/22/2022 03:00 AM    BILIRUBINUR NEGATIVE 03/22/2022 03:00 AM    UROBILINOGEN Normal 03/22/2022 03:00 AM    NITRU NEGATIVE 03/22/2022 03:00 AM    LEUKOCYTESUR NEGATIVE 03/22/2022 03:00 AM     TSH:  No results found for: TSH     Radiology:  XR CHEST PORTABLE    Result Date: 12/7/2022  No confluent airspace consolidation. Stable cardiomegaly without evidence of CHF. Consultations:    Consults:     Final Specialist Recommendations/Findings:   IP CONSULT TO INTERNAL MEDICINE  IP CONSULT TO CARDIOLOGY      The patient was seen and examined on day of discharge and this discharge summary is in conjunction with any daily progress note from day of discharge. Discharge plan:     Disposition: Home    Physician Follow Up:   PCP within 1 week       Requiring Further Evaluation/Follow Up POST HOSPITALIZATION/Incidental Findings:  Follow-up with cardiology as scheduled    Diet: cardiac diet and diabetic diet    Activity: As tolerated    Instructions to Patient: Avoid polypharmacy    Discharge Medications:      Medication List        START taking these medications      sulfamethoxazole-trimethoprim 800-160 MG per tablet  Commonly known as: BACTRIM DS;SEPTRA DS  Take 1 tablet by mouth every 12 hours for 5 days            CONTINUE taking these medications      albuterol sulfate  (90 Base) MCG/ACT inhaler  Commonly known as: PROVENTIL;VENTOLIN;PROAIR  Inhale 2 puffs into the lungs every 4 hours as needed for Wheezing     alfuzosin 10 MG extended release tablet  Commonly known as: UROXATRAL     amiodarone 200 MG tablet  Commonly known as: CORDARONE  Take 1 tablet by mouth daily     apixaban 5 MG Tabs tablet  Commonly known as: ELIQUIS  Take 1 tablet by mouth 2 times daily     aspirin 81 MG chewable tablet     atorvastatin 40 MG tablet  Commonly known as: LIPITOR     ferrous sulfate 325 (65 Fe) MG tablet  Commonly known as: IRON 325     fluticasone 50 MCG/ACT nasal

## 2022-12-11 NOTE — PLAN OF CARE
Problem: Discharge Planning  Goal: Discharge to home or other facility with appropriate resources  12/10/2022 2032 by Giovani Pop RN  Outcome: Progressing     Problem: Safety - Adult  Goal: Free from fall injury  12/10/2022 2032 by Giovani Pop RN  Outcome: Progressing     Problem: ABCDS Injury Assessment  Goal: Absence of physical injury  12/10/2022 2032 by Giovani Pop RN  Outcome: Progressing     Problem: Chronic Conditions and Co-morbidities  Goal: Patient's chronic conditions and co-morbidity symptoms are monitored and maintained or improved  12/10/2022 2032 by Giovani Pop RN  Outcome: Progressing

## 2022-12-11 NOTE — PROGRESS NOTES
Pt discharged to home in good condition with belongings  Discharge instructions given  Pt denies having any further questions at this time  Locked up home medication(s)/personal items given to patient at discharge  Patient/family state they have everything they were admitted with.   Waiting for ride to take him home

## 2022-12-11 NOTE — PROGRESS NOTES
Patient received from ICU. Video  used since patient speaks Maltese. Assessment completed. No distress noted. POC and education reviewed with patient. Call light within reach, and pt educated on its use. Bed in lowest position, and locked. Side rails up x 2. Denied further questions or needs at this time. Will continue to monitor.

## 2022-12-11 NOTE — PROGRESS NOTES
[facility]  Office: [address], DO, Robert Lomeli, DO, Gail Oreilly, DO, Nevaeh Villegas Blood, DO, Adarsh Lovell MD, Lexii Breen MD, Britt Sanchez MD, Bre Pike MD,  Doretha Herrera MD, Herbert Urbina MD, Paul Tam, DO, Renee Cheema MD,  Na Hernandez MD, Elias Warner MD, Momo Jo DO, Hugo Hernandez MD, Abelardo Austin MD, Carolyn Mack DO, Jammie Ibrahim MD, Alison Jovel MD, Devon George MD, Venkat Perez MD, Galilea Vasquez DO, Meg Wolff MD, Bhavya Bolden MD, Pavan Moreno, CNP,  Dixon Lindo, CNP, Liz Wong, CNP, Olivia Bolden, CNP,  Estelita Cueto, DNP, Brennon Jacob, CNP, Candace Garcia, CNP, Lorri Wise, CNP, Yanet Giordano, CNP, Tiesha Baumann, CNP, Evelyn Zamora PA-C, Adriana Pedro, CNS, Campos Krueger, CNP, Neisha Prom, [facility]    Progress Note    12/11/2022    1:05 PM    Name:   Pierce Flynn  MRN:     [de-identified]     Acct:      [de-identified]   Room:   2022/202201   Day:  0  Admit Date:  12/9/2022 10:39 PM    PCP:   Ian Phillips MD  Code Status:  Full Code    Subjective:     C/C:   Chief Complaint   Patient presents with    Dizziness     Interval History Status:   Talked through  on phone juan carlos   Denies dizziness today  States probably he took multiple pills at the same time at home leading to dizziness  Right arm cellulitis/swelling is better today    Brief History: Pierce Flynn is a 47 y.o. male who presents to the hosptial for evaluation of low blood pressure. He says he was sitting at home in his couch when he started feeling very light headed and dizzy. He says he passed out on the cough for a few minutes prior to regaining consciousness. Denies hitting head. No loss of bowel or bladder function. Patient was recently discharged from the hospital after being admitted for similar symptoms.   At that time he was found to have ventricular tachycardia and was seen by cardiology who put him on amiodarone. Patient has a history of heart failure with reduced ejection fraction and was recently started on Aldactone. He is non-English-speaking and there seems to be some confusion with his medications. He has most of his medications in a brown bag. He says its possible that he may have taken more medications than he was supposed to. His blood pressure at home was in 56'E systolic. He denies any chest pain, shortness of breath. He does also have erythema on arm        Review of Systems:     Constitutional:  negative for chills, fevers, sweats  Respiratory:  negative for cough, dyspnea on exertion, shortness of breath, wheezing  Cardiovascular:  negative for chest pain, chest pressure/discomfort, lower extremity edema, palpitations  Gastrointestinal:  negative for abdominal pain, constipation, diarrhea, nausea, vomiting  Neurological:  negative for dizziness, headache    Medications: Allergies: Allergies   Allergen Reactions    Dapagliflozin Rash     Facial rash, and pt reported having pins/needles/burning sensation on left arm. Facial rash, and pt reported having pins/needles/burning sensation on left arm.           Current Meds:   Scheduled Meds:    amiodarone  200 mg Oral Daily    apixaban  5 mg Oral BID    aspirin  81 mg Oral Daily    atorvastatin  40 mg Oral Daily    canagliflozin  100 mg Oral QAM AC    ferrous sulfate  325 mg Oral Daily with breakfast    fluticasone  1 spray Nasal Daily    Liraglutide  1.8 mg SubCUTAneous Daily    metoprolol tartrate  25 mg Oral BID    [Held by provider] lisinopril  2.5 mg Oral Daily    pantoprazole  40 mg Oral QAM AC    sodium chloride flush  5-40 mL IntraVENous 2 times per day    [Held by provider] glipiZIDE  10 mg Oral BID AC    insulin glargine  50 Units SubCUTAneous Daily    sulfamethoxazole-trimethoprim  1 tablet Oral 2 times per day    furosemide  20 mg Oral Daily    tamsulosin  0.4 mg Oral Daily    [Held by provider] spironolactone  25 mg Oral Daily     Continuous Infusions:    sodium chloride      dextrose       PRN Meds: HYDROcodone 5 mg - acetaminophen **OR** HYDROcodone 5 mg - acetaminophen, sodium chloride flush, sodium chloride, potassium chloride **OR** potassium alternative oral replacement **OR** potassium chloride, magnesium sulfate, ondansetron **OR** ondansetron, polyethylene glycol, acetaminophen **OR** acetaminophen, glucose, dextrose bolus **OR** dextrose bolus, glucagon (rDNA), dextrose    Data:     Past Medical History:   has a past medical history of CAD (coronary artery disease), Diabetes mellitus ([facility] [address] Hyperlipidemia, Hypertension, and [facility]) [facility] Social History:   reports that he has been smoking. He has never used smokeless tobacco. He reports that he does not drink alcohol and does not use drugs. Family History:   Family History   Problem Relation Age of Onset    Heart Attack Mother     Heart Attack Father        Vitals:  BP (!) 89/48   Pulse 71   Temp 98.1 °F (36.7 °C) (Oral)   Resp 16   Ht 5' 6.93\" (1.7 m)   Wt 211 lb (95.7 kg)   SpO2 95%   BMI 33.12 kg/m²   Temp (24hrs), Av.2 °F (36.8 °C), Min:97.9 °F (36.6 °C), Max:98.4 °F (36.9 °C)    Recent Labs     12/10/22  0904 12/10/22  20522  0604 22  1125   POCGLU 70* 254* 124* 285*       I/O (24Hr):   No intake or output data in the 24 hours ending 22 1305    Labs:  Hematology:  Recent Labs     22  2250   WBC 12.6*   RBC 6.45*   HGB 13.0   HCT 45.8   MCV 71.0*   MCH 20.2*   MCHC 28.4   RDW 19.9*      MPV 10.5     Chemistry:  Recent Labs     22  0326 22  2250 12/10/22  0449 22  0613   NA  --  134* 137 134*   K  --  4.2 4.0 4.3   CL  --  98 102 102   CO2  --  24 24 18*   GLUCOSE  --  148* 62* 107*   BUN  --  12 12 12   CREATININE  --  0.99 0.95 0.79   ANIONGAP  --  12 11 14   LABGLOM  --  >60 >60 >60   CALCIUM  -- 8.9 8.7 8.7   PROBNP 141  --   --   --    TROPHS  --  17  --   --      Recent Labs     12/09/22  2250 12/10/22  0526 12/10/22  0544 12/10/22  0632 12/10/22  0904 12/10/22  2051 12/11/22  0604 12/11/22  1125   PROT 7.5  --   --   --   --   --   --   --    LABALBU 4.2  --   --   --   --   --   --   --    AST 13  --   --   --   --   --   --   --    ALT 14  --   --   --   --   --   --   --    ALKPHOS 147*  --   --   --   --   --   --   --    BILITOT 0.6  --   --   --   --   --   --   --    POCGLU  --    < > 86 117* 70* 254* 124* 285*    < > = values in this interval not displayed. ABG:No results found for: POCPH, PHART, PH, POCPCO2, PRM4QVT, PCO2, POCPO2, PO2ART, PO2, POCHCO3, KVJ5UXJ, HCO3, NBEA, PBEA, BEART, BE, THGBART, THB, IVR3XLB, VUNE8ZTN, C9XRXYFV, O2SAT, FIO2  Lab Results   Component Value Date/Time    SPECIAL NOT REPORTED 09/15/2019 12:20 PM     Lab Results   Component Value Date/Time    CULTURE (A) 09/15/2019 11:21 AM     POSITIVE Blood Culture Results called to and read back by: Yunier Zhu. FROM [facility] AT 5123 ON 09/16/2019    CULTURE DIRECT GRAM STAIN FROM BOTTLE: GRAM NEGATIVE RODS 09/15/2019 11:21 AM    CULTURE ESCHERICHIA COLI (A) 09/15/2019 11:21 AM       Radiology:  XR CHEST PORTABLE    Result Date: 12/7/2022  No confluent airspace consolidation. Stable cardiomegaly without evidence of CHF.        Physical Examination:        General appearance:  alert, cooperative and no distress  Mental Status:  oriented to person, place and time and normal affect  Lungs:  clear to auscultation bilaterally, normal effort  Heart:  regular rate and rhythm, no murmur,+ ICD  Abdomen:  soft, nontender, nondistended, normal bowel sounds, no masses, hepatomegaly, splenomegaly  Extremities:  + Swelling right arm at IV site, redness has improved  Skin:  no gross lesions, rashes, induration    Assessment:        Hospital Problems             Last Modified POA    * (Principal) Syncope and collapse 12/10/2022 Yes Cellulitis of right upper extremity 12/10/2022 Yes    Hypotension due to medication 12/10/2022 Yes    Essential hypertension 12/10/2022 Yes    Hyperlipidemia 12/10/2022 Yes    Neuropathy due to secondary diabetes mellitus ([facility] 12/10/2022 Yes    LV (left ventricular) mural thrombus-mobile 12/10/2022 Yes    Obesity with body mass index 30 or greater 12/10/2022 Yes    Type 2 diabetes mellitus with hyperglycemia, with long-term current use of insulin ([facility] [address] 12/10/2022 Yes    Chronic systolic heart failure ([facility] [address] 12/10/2022 Yes       Plan:        Discussed to keep medicines in his brownbag only which are being prescribed now and should keep other medicines separately, avoid polypharmacy  Discussed to space out medications instead of taking altogether  Continue Bactrim for 5 days for cellulitis right arm  Continue Eliquis due to concern for Coumadin failure as he is having LV thrombus which is unresolved  Discharge home today    Melvin Eisenmenger, MD  12/11/2022  1:05 PM

## 2022-12-11 NOTE — PROGRESS NOTES
[facility] NOTE    Room # 2022/2022-01   Name: Jono Grier              Reason for visit: Routine    I visited the patient. Admit Date & Time: 12/9/2022 10:39 PM    Assessment:  Jono Grier is a 47 y.o. male. PT: and family member present . Upon entering the room patient states he does not speak [place]. Intervention:   provided a ministry presence and brief prayer. Outcome:  Patient grateful. Plan:  Chaplains will remain available to offer spiritual and emotional support as needed. Electronically signed by Chaplain Rashad, on 12/11/2022 at 1:45 PM.  Madelin      12/11/22 1339   Encounter Summary   Service Provided For: Patient and family together   Referral/Consult From: [address] [facility] Family members   Last Encounter  12/11/22   Complexity of Encounter Low   Begin Time 1142   End Time  1143   Total Time Calculated 1 min   Encounter    Type Initial Screen/Assessment   Assessment/Intervention/Outcome   Assessment Calm; Unable to assess   Intervention Sustaining Presence/Ministry of presence   Outcome Expressed Gratitude

## 2022-12-11 NOTE — PROGRESS NOTES
Patient has a big bag of home meds that writer placed in locked cabinet in patients room. His home medication Invonka sent down to pharmacy for label since our pharmacy does not carry. Our pharmacy also does not carry Victoza however patient states he does not have that medication with him. Used video  to explain why meds were put in lock up and that Invonka will be sent back up from pharmacy and Nurse will administer. Patient stated he understood.

## 2022-12-11 NOTE — PLAN OF CARE
Problem: Discharge Planning  Goal: Discharge to home or other facility with appropriate resources  12/11/2022 0934 by Maggie Sheppard RN  Outcome: Progressing  Flowsheets (Taken 12/11/2022 0800)  Discharge to home or other facility with appropriate resources:   Identify barriers to discharge with patient and caregiver   Arrange for needed discharge resources and transportation as appropriate   Identify discharge learning needs (meds, wound care, etc)   Arrange for interpreters to assist at discharge as needed   Refer to discharge planning if patient needs post-hospital services based on physician order or complex needs related to functional status, cognitive ability or social support system  12/10/2022 2032 by Mirella Valentine RN  Outcome: Progressing     Problem: Safety - Adult  Goal: Free from fall injury  12/11/2022 0934 by Maggie Sheppard RN  Outcome: Progressing  Flowsheets (Taken 12/11/2022 0934)  Free From Fall Injury: Instruct family/caregiver on patient safety  12/10/2022 2032 by Mirella Valentine RN  Outcome: Progressing     Problem: ABCDS Injury Assessment  Goal: Absence of physical injury  12/11/2022 0934 by Maggie Sheppard RN  Outcome: Progressing  Flowsheets (Taken 12/11/2022 0934)  Absence of Physical Injury: Implement safety measures based on patient assessment  12/10/2022 2032 by Mirella Valentine RN  Outcome: Progressing     Problem: Chronic Conditions and Co-morbidities  Goal: Patient's chronic conditions and co-morbidity symptoms are monitored and maintained or improved  12/11/2022 0934 by Maggie Sheppard RN  Outcome: Progressing  Flowsheets (Taken 12/11/2022 0800)  Care Plan - Patient's Chronic Conditions and Co-Morbidity Symptoms are Monitored and Maintained or Improved:   Monitor and assess patient's chronic conditions and comorbid symptoms for stability, deterioration, or improvement   Collaborate with multidisciplinary team to address chronic and comorbid conditions and prevent exacerbation or deterioration   Update acute care plan with appropriate goals if chronic or comorbid symptoms are exacerbated and prevent overall improvement and discharge  12/10/2022 2032 by Fabiano Hurtado RN  Outcome: Progressing

## 2022-12-12 LAB
EKG ATRIAL RATE: 81 BPM
EKG P AXIS: 36 DEGREES
EKG P-R INTERVAL: 162 MS
EKG Q-T INTERVAL: 362 MS
EKG QRS DURATION: 96 MS
EKG QTC CALCULATION (BAZETT): 420 MS
EKG R AXIS: -20 DEGREES
EKG T AXIS: 92 DEGREES
EKG VENTRICULAR RATE: 81 BPM
